# Patient Record
Sex: MALE | Race: WHITE
[De-identification: names, ages, dates, MRNs, and addresses within clinical notes are randomized per-mention and may not be internally consistent; named-entity substitution may affect disease eponyms.]

---

## 2018-01-11 ENCOUNTER — HOSPITAL ENCOUNTER (EMERGENCY)
Dept: HOSPITAL 9 - MADERS | Age: 78
LOS: 1 days | Discharge: ANOTHER HOSPITAL | End: 2018-01-12
Payer: MEDICARE

## 2018-01-11 DIAGNOSIS — J44.9: ICD-10-CM

## 2018-01-11 DIAGNOSIS — I48.91: ICD-10-CM

## 2018-01-11 DIAGNOSIS — F17.210: ICD-10-CM

## 2018-01-11 DIAGNOSIS — Z79.899: ICD-10-CM

## 2018-01-11 DIAGNOSIS — I50.9: ICD-10-CM

## 2018-01-11 DIAGNOSIS — Z79.01: ICD-10-CM

## 2018-01-11 DIAGNOSIS — Z79.82: ICD-10-CM

## 2018-01-11 DIAGNOSIS — J18.9: Primary | ICD-10-CM

## 2018-01-11 DIAGNOSIS — I11.0: ICD-10-CM

## 2018-01-11 DIAGNOSIS — C34.90: ICD-10-CM

## 2018-01-11 LAB
ANION GAP SERPL CALC-SCNC: 19 MMOL/L (ref 10–20)
ANISOCYTOSIS BLD QL SMEAR: (no result) (100X)
BUN SERPL-MCNC: 19 MG/DL (ref 8.4–25.7)
CALCIUM SERPL-MCNC: 9.2 MG/DL (ref 7.8–10.44)
CHLORIDE SERPL-SCNC: 97 MMOL/L (ref 98–107)
CK MB SERPL-MCNC: 4.3 NG/ML (ref 0–6.6)
CO2 SERPL-SCNC: 28 MMOL/L (ref 23–31)
CREAT CL PREDICTED SERPL C-G-VRATE: 0 ML/MIN (ref 70–130)
DELETE AUTO DIFF??: YES
GLUCOSE SERPL-MCNC: 171 MG/DL (ref 83–110)
HGB BLD-MCNC: 14.3 G/DL (ref 14–18)
MACROCYTES BLD QL SMEAR: (no result) (100X)
MCH RBC QN AUTO: 34.1 PG (ref 27–31)
MCV RBC AUTO: 102.4 FL (ref 80–94)
MDIFF COMPLETE?: YES
PLATELET # BLD AUTO: 149 THOU/UL (ref 130–400)
POTASSIUM SERPL-SCNC: 3.9 MMOL/L (ref 3.5–5.1)
RBC # BLD AUTO: 4.18 MILL/UL (ref 4.7–6.1)
SODIUM SERPL-SCNC: 140 MMOL/L (ref 136–145)
TROPONIN I SERPL DL<=0.01 NG/ML-MCNC: 0.06 NG/ML (ref ?–0.03)
WBC # BLD AUTO: 8.4 THOU/UL (ref 4.8–10.8)

## 2018-01-11 PROCEDURE — 94640 AIRWAY INHALATION TREATMENT: CPT

## 2018-01-11 PROCEDURE — 96365 THER/PROPH/DIAG IV INF INIT: CPT

## 2018-01-11 PROCEDURE — 71045 X-RAY EXAM CHEST 1 VIEW: CPT

## 2018-01-11 PROCEDURE — 83880 ASSAY OF NATRIURETIC PEPTIDE: CPT

## 2018-01-11 PROCEDURE — 80048 BASIC METABOLIC PNL TOTAL CA: CPT

## 2018-01-11 PROCEDURE — 96375 TX/PRO/DX INJ NEW DRUG ADDON: CPT

## 2018-01-11 PROCEDURE — 36415 COLL VENOUS BLD VENIPUNCTURE: CPT

## 2018-01-11 PROCEDURE — 93005 ELECTROCARDIOGRAM TRACING: CPT

## 2018-01-11 PROCEDURE — 72125 CT NECK SPINE W/O DYE: CPT

## 2018-01-11 PROCEDURE — 70450 CT HEAD/BRAIN W/O DYE: CPT

## 2018-01-11 PROCEDURE — G0390 TRAUMA RESPONS W/HOSP CRITI: HCPCS

## 2018-01-11 PROCEDURE — 84484 ASSAY OF TROPONIN QUANT: CPT

## 2018-01-11 PROCEDURE — 82553 CREATINE MB FRACTION: CPT

## 2018-01-11 PROCEDURE — 94760 N-INVAS EAR/PLS OXIMETRY 1: CPT

## 2018-01-11 PROCEDURE — 85025 COMPLETE CBC W/AUTO DIFF WBC: CPT

## 2018-01-11 NOTE — CT
CT BRAIN WITHOUT CONTRAST

1/11/18

 

HISTORY: 

Trauma. Fall. Hematoma to the left side of the head.

 

FINDINGS:  

Comparison made to exam of 11/2/03.

 

No evidence of acute infarct, hemorrhage, midline shift or abnormal extra-axial fluid collections are
 seen. The ventricular size is appropriate and the basilar cisterns patent. The bony calvarium is int
act. There is mucosal disease in the paranasal sinuses. Asymmetric calvarial thickening in the lower 
portion of the left medial cranial fossa is stable. There is a scalp hematoma in the left frontoparie
tere region. 

 

IMPRESSION:  

No CT evidence of acute intracranial process. 

 

POS: [facility]

## 2018-01-11 NOTE — CT
CT CERVICAL SPINE WITH CORONAL AND SAGITTAL REFORMATIONS:

1/11/18

 

HISTORY: 

Fall. 

 

FINDINGS/IMPRESSION:  

Multilevel degenerative changes seen in the cervical spine. No acute fracture or subluxation identifi
ed. 

 

There is incomplete evaluation of a mass-like density in the left upper lobe. A right sided pleural e
ffusion is present. Further evaluation with CT scan of the chest would be helpful.

 

 

 

POS: [facility]

## 2018-01-11 NOTE — RAD
PORTABLE CHEST ONE VIEW

1/11/18 at 10:52 p.m.

 

HISTORY: 

Cough, congestion and atrial fibrillation, fall.

 

FINDINGS/IMPRESSION:  

The heart size is normal. The aorta is tortuous. There is patchy consolidation in the left upper lobe
. No pneumothoraces or large effusions are seen. A small right pleural effusion may be present. Findi
ngs are suspicious for pneumonia. 

 

POS: [facility]

## 2018-01-29 ENCOUNTER — HOSPITAL ENCOUNTER (INPATIENT)
Dept: HOSPITAL 92 - ERS | Age: 78
LOS: 7 days | Discharge: HOSPICE | DRG: 808 | End: 2018-02-05
Attending: INTERNAL MEDICINE | Admitting: INTERNAL MEDICINE
Payer: MEDICARE

## 2018-01-29 VITALS — BODY MASS INDEX: 30.4 KG/M2

## 2018-01-29 DIAGNOSIS — I24.8: ICD-10-CM

## 2018-01-29 DIAGNOSIS — J44.1: ICD-10-CM

## 2018-01-29 DIAGNOSIS — Z87.01: ICD-10-CM

## 2018-01-29 DIAGNOSIS — E11.9: ICD-10-CM

## 2018-01-29 DIAGNOSIS — D61.810: Primary | ICD-10-CM

## 2018-01-29 DIAGNOSIS — R13.10: ICD-10-CM

## 2018-01-29 DIAGNOSIS — C34.12: ICD-10-CM

## 2018-01-29 DIAGNOSIS — Z87.891: ICD-10-CM

## 2018-01-29 DIAGNOSIS — D53.9: ICD-10-CM

## 2018-01-29 DIAGNOSIS — I50.33: ICD-10-CM

## 2018-01-29 DIAGNOSIS — F05: ICD-10-CM

## 2018-01-29 DIAGNOSIS — I11.0: ICD-10-CM

## 2018-01-29 DIAGNOSIS — E87.6: ICD-10-CM

## 2018-01-29 DIAGNOSIS — D68.9: ICD-10-CM

## 2018-01-29 DIAGNOSIS — I48.2: ICD-10-CM

## 2018-01-29 DIAGNOSIS — Z92.21: ICD-10-CM

## 2018-01-29 DIAGNOSIS — Z66: ICD-10-CM

## 2018-01-29 DIAGNOSIS — C79.9: ICD-10-CM

## 2018-01-29 DIAGNOSIS — Z51.5: ICD-10-CM

## 2018-01-29 DIAGNOSIS — A41.1: ICD-10-CM

## 2018-01-29 DIAGNOSIS — N17.9: ICD-10-CM

## 2018-01-29 DIAGNOSIS — D69.6: ICD-10-CM

## 2018-01-29 DIAGNOSIS — R65.20: ICD-10-CM

## 2018-01-29 DIAGNOSIS — G93.40: ICD-10-CM

## 2018-01-29 LAB
ALBUMIN SERPL BCG-MCNC: 3 G/DL (ref 3.4–4.8)
ALP SERPL-CCNC: 64 U/L (ref 40–150)
ALT SERPL W P-5'-P-CCNC: 16 U/L (ref 8–55)
ANION GAP SERPL CALC-SCNC: 13 MMOL/L (ref 10–20)
APTT PPP: 60.5 SEC (ref 22.9–36.1)
AST SERPL-CCNC: 13 U/L (ref 5–34)
BILIRUB SERPL-MCNC: 2.1 MG/DL (ref 0.2–1.2)
BUN SERPL-MCNC: 39 MG/DL (ref 8.4–25.7)
CALCIUM SERPL-MCNC: 9.1 MG/DL (ref 7.8–10.44)
CHLORIDE SERPL-SCNC: 103 MMOL/L (ref 98–107)
CK MB SERPL-MCNC: 0.7 NG/ML (ref 0–6.6)
CK MB SERPL-MCNC: 0.8 NG/ML (ref 0–6.6)
CK MB SERPL-MCNC: 1.1 NG/ML (ref 0–6.6)
CK MB SERPL-MCNC: 1.2 NG/ML (ref 0–6.6)
CK SERPL-CCNC: 36 U/L (ref 30–200)
CO2 SERPL-SCNC: 27 MMOL/L (ref 23–31)
CREAT CL PREDICTED SERPL C-G-VRATE: 0 ML/MIN (ref 70–130)
GLOBULIN SER CALC-MCNC: 2.6 G/DL (ref 2.4–3.5)
GLUCOSE SERPL-MCNC: 218 MG/DL (ref 83–110)
HGB BLD-MCNC: 9.6 G/DL (ref 14–18)
INR PPP: 2.2
MACROCYTES BLD QL SMEAR: (no result) (100X)
MAGNESIUM SERPL-MCNC: 1.7 MG/DL (ref 1.6–2.6)
MCH RBC QN AUTO: 35 PG (ref 27–31)
MCV RBC AUTO: 102 FL (ref 80–94)
MDIFF COMPLETE?: YES
PLATELET # BLD AUTO: 5 THOU/UL (ref 130–400)
PLATELET BLD QL SMEAR: (no result)
POTASSIUM SERPL-SCNC: 3.9 MMOL/L (ref 3.5–5.1)
PROTHROMBIN TIME: 25.4 SEC (ref 12–14.7)
RBC # BLD AUTO: 2.74 MILL/UL (ref 4.7–6.1)
REFLEX FOR REVIEW??: YES
SODIUM SERPL-SCNC: 139 MMOL/L (ref 136–145)
SPHEROCYTES BLD QL SMEAR: (no result) (100X)
TROPONIN I SERPL DL<=0.01 NG/ML-MCNC: 0.07 NG/ML (ref ?–0.03)
TROPONIN I SERPL DL<=0.01 NG/ML-MCNC: 0.07 NG/ML (ref ?–0.03)
TROPONIN I SERPL DL<=0.01 NG/ML-MCNC: 0.08 NG/ML (ref ?–0.03)
TROPONIN I SERPL DL<=0.01 NG/ML-MCNC: 0.08 NG/ML (ref ?–0.03)
WBC # BLD AUTO: 0.4 THOU/UL (ref 4.8–10.8)

## 2018-01-29 PROCEDURE — 85025 COMPLETE CBC W/AUTO DIFF WBC: CPT

## 2018-01-29 PROCEDURE — 82550 ASSAY OF CK (CPK): CPT

## 2018-01-29 PROCEDURE — 85027 COMPLETE CBC AUTOMATED: CPT

## 2018-01-29 PROCEDURE — 70491 CT SOFT TISSUE NECK W/DYE: CPT

## 2018-01-29 PROCEDURE — 96361 HYDRATE IV INFUSION ADD-ON: CPT

## 2018-01-29 PROCEDURE — 70450 CT HEAD/BRAIN W/O DYE: CPT

## 2018-01-29 PROCEDURE — 96365 THER/PROPH/DIAG IV INF INIT: CPT

## 2018-01-29 PROCEDURE — 87186 SC STD MICRODIL/AGAR DIL: CPT

## 2018-01-29 PROCEDURE — 85730 THROMBOPLASTIN TIME PARTIAL: CPT

## 2018-01-29 PROCEDURE — 85007 BL SMEAR W/DIFF WBC COUNT: CPT

## 2018-01-29 PROCEDURE — 94640 AIRWAY INHALATION TREATMENT: CPT

## 2018-01-29 PROCEDURE — 80053 COMPREHEN METABOLIC PANEL: CPT

## 2018-01-29 PROCEDURE — 83036 HEMOGLOBIN GLYCOSYLATED A1C: CPT

## 2018-01-29 PROCEDURE — 96375 TX/PRO/DX INJ NEW DRUG ADDON: CPT

## 2018-01-29 PROCEDURE — A4216 STERILE WATER/SALINE, 10 ML: HCPCS

## 2018-01-29 PROCEDURE — 86900 BLOOD TYPING SEROLOGIC ABO: CPT

## 2018-01-29 PROCEDURE — 36430 TRANSFUSION BLD/BLD COMPNT: CPT

## 2018-01-29 PROCEDURE — 93306 TTE W/DOPPLER COMPLETE: CPT

## 2018-01-29 PROCEDURE — 86850 RBC ANTIBODY SCREEN: CPT

## 2018-01-29 PROCEDURE — 83735 ASSAY OF MAGNESIUM: CPT

## 2018-01-29 PROCEDURE — 87077 CULTURE AEROBIC IDENTIFY: CPT

## 2018-01-29 PROCEDURE — 85610 PROTHROMBIN TIME: CPT

## 2018-01-29 PROCEDURE — 83605 ASSAY OF LACTIC ACID: CPT

## 2018-01-29 PROCEDURE — 80048 BASIC METABOLIC PNL TOTAL CA: CPT

## 2018-01-29 PROCEDURE — 80202 ASSAY OF VANCOMYCIN: CPT

## 2018-01-29 PROCEDURE — 74230 X-RAY XM SWLNG FUNCJ C+: CPT

## 2018-01-29 PROCEDURE — 30233R1 TRANSFUSION OF NONAUTOLOGOUS PLATELETS INTO PERIPHERAL VEIN, PERCUTANEOUS APPROACH: ICD-10-PCS | Performed by: FAMILY MEDICINE

## 2018-01-29 PROCEDURE — 96367 TX/PROPH/DG ADDL SEQ IV INF: CPT

## 2018-01-29 PROCEDURE — 83880 ASSAY OF NATRIURETIC PEPTIDE: CPT

## 2018-01-29 PROCEDURE — 82553 CREATINE MB FRACTION: CPT

## 2018-01-29 PROCEDURE — 71045 X-RAY EXAM CHEST 1 VIEW: CPT

## 2018-01-29 PROCEDURE — 85060 BLOOD SMEAR INTERPRETATION: CPT

## 2018-01-29 PROCEDURE — 72125 CT NECK SPINE W/O DYE: CPT

## 2018-01-29 PROCEDURE — 36416 COLLJ CAPILLARY BLOOD SPEC: CPT

## 2018-01-29 PROCEDURE — 86901 BLOOD TYPING SEROLOGIC RH(D): CPT

## 2018-01-29 PROCEDURE — 87040 BLOOD CULTURE FOR BACTERIA: CPT

## 2018-01-29 PROCEDURE — P9035 PLATELET PHERES LEUKOREDUCED: HCPCS

## 2018-01-29 PROCEDURE — 84484 ASSAY OF TROPONIN QUANT: CPT

## 2018-01-29 PROCEDURE — 93005 ELECTROCARDIOGRAM TRACING: CPT

## 2018-01-29 PROCEDURE — 87149 DNA/RNA DIRECT PROBE: CPT

## 2018-01-29 PROCEDURE — 36415 COLL VENOUS BLD VENIPUNCTURE: CPT

## 2018-01-29 RX ADMIN — Medication SCH: at 16:48

## 2018-01-29 RX ADMIN — MULTIPLE VITAMINS W/ MINERALS TAB SCH: TAB at 16:48

## 2018-01-29 RX ADMIN — CEFEPIME HYDROCHLORIDE SCH MLS: 2 INJECTION, POWDER, FOR SOLUTION INTRAVENOUS at 22:16

## 2018-01-29 RX ADMIN — CEFEPIME HYDROCHLORIDE SCH: 2 INJECTION, POWDER, FOR SOLUTION INTRAVENOUS at 13:12

## 2018-01-29 RX ADMIN — Medication SCH ML: at 21:58

## 2018-01-29 RX ADMIN — CYANOCOBALAMIN TAB 1000 MCG SCH: 1000 TAB at 16:47

## 2018-01-29 NOTE — CT
PRELIMINARY REPORT/[facility]/EMERGENCY AFTER

HOURS PROCEDURE:

 

EXAM:

CT Head Without Intravenous Contrast

 

EXAM DATE/TIME:

Exam ordered 1/29/2018 3:43 AM

 

CLINICAL HISTORY:

77 years old, male; Injury or trauma; Fall; Initial encounter; Abrasion; Forehead; Patient HX: Previo
us on pacs; Er 3; M 77 w/ HX of lung cancer presents to ed with fever. Began chemo 2 weeks ago, famil
y reports intermittent shaking. Pt had a fall 2 weeks ago. Dark purple bruising noted to left orbital
 area and face

 

TECHNIQUE:

Axial computed tomography images of the head/brain without intravenous contrast.

 

COMPARISON:

No relevant prior studies available.

 

FINDINGS:

Brain: Volume loss and chronic small vessel ischemic change. No hemorrhage.

Ventricles: Unremarkable. No ventriculomegaly.

Bones/joints: Thickening of the inferior left temporal skull may represent fibrous dysplasia. No frac
ture.

Soft tissues: Unremarkable.

Sinuses: Unremarkable as visualized. No acute sinusitis.

Mastoid air cells: Unremarkable as visualized. No mastoid effusion.

 

IMPRESSION:

No intracranial hemorrhage.

 

Thank you for allowing us to participate in the care of your patient.

 

Dictated and Authenticated by: Juan Rose MD

01/29/2018 3:57 AM Central Time ([place] & [place])

 

 

 

                           FINAL REPORT

 

CT BRAIN WITHOUT CONTRAST:

 

I agree with the preliminary report given by Dr. Juan Rose of [facility].

Comparison is made with the exam of 1/11/2018.  No CT evidence of acute intracranial process is seen.
 

 

POS: [facility]

## 2018-01-29 NOTE — CON
DATE OF CONSULTATION:  01/29/2018

 

REASON FOR CONSULTATION:  Pancytopenia.

 

HISTORY OF PRESENT ILLNESS:  Mr. Newman is a 77-year-old gentleman, who has a limited-stage 3 small
 cell lung cancer.  He just completed cycle 1 of 3-day treatment consisting of carboplatin and -16.
  This was completed on Friday, 01/19/2018.  He did receive a Neulasta injection after treatment.  Ov
er the last 24-36 hours, the patient has had low-grade fever with chills.  He has had confusion.  He 
has, in the past, had multiple falls and again fell over the weekend.  He had a left clavicle fractur
e as a result of the fall on 01/13/2018.  Patient called our service early this morning and was sent 
to the emergency room for evaluation.  Once here, his white count was 400, his hemoglobin was 9.6, an
d his platelet count was 5000.  A 1 unit of platelets has been ordered and ready for transfusion.  Th
e patient has had some confusion over the past few days as well.  He did have a brain CT, which showe
d no metastatic disease.  He had a cervical spine CT, which showed a nondisplaced fracture of the T1 
vertebra.  On arrival, the patient was in atrial fibrillation with RVR.  His BNP was 1439.  He was pa
ncultured and given cefepime and Zosyn and vancomycin.  He did receive a dose of digoxin for his atri
al fibrillation.  He is being admitted for neutropenic fever.

 

PAST MEDICAL HISTORY:

1.  Limited-stage 3 small cell lung cancer.

2.  Diabetes mellitus, 2.

3.  High blood pressure.

4.  Chronic obstructive pulmonary disease.

5.  Anxiety, depression.

6.  Hemorrhoids.

7.  Arthritis.

8.  Sciatica.

9.  Questionable Alzheimer's.

10.  Atrial fibrillation.

 

PAST SURGICAL HISTORY:

1.  Lung biopsy.

2.  Stent placement.

3.  Cholecystectomy.

 

ALLERGIES:  No known drug allergies.

 

HOME MEDICATIONS:

1.  Alprazolam 1 mg daily.

2.  Aspirin 81 mg daily.

3.  Coreg 6.25 mg b.i.d.

4.  Duloxetine 60 mg daily.

5.  Eliquis 5 mg daily.

6.  Furosemide 20 mg daily.

7.  Hydrocodone p.r.n.

8.  Lisinopril 20 mg daily.

9.  Lovastatin 40 mg daily.

10.  Quetiapine 50 mg in the morning and 100 mg in the evening.

11.  Spironolactone 25 mg daily.

 

FAMILY HISTORY:  Noncontributory.

 

SOCIAL HISTORY:  , lives with his spouse.  He smokes a pack a day.  He has former alcohol cons
umption, but none now.  No illicit drug use.  He is a retired .

 

REVIEW OF SYSTEMS:  Constitutional:  Positive for fever and chills and a 50-pound weight loss over th
e last 6 months.  Eyes:  No blurred or double vision.  ENT:  Positive for sore throat, hoarseness, dy
sphagia.  Cardiovascular:  No chest pain, palpitations, or syncope.  Respiratory:  Positive for short
ness of breath and cough.  Gastrointestinal:  Positive for nausea and abdominal pain.  Genitourinary:
  Positive for joint and back pain.  Skin:  No rash or pruritus.  Hematological:  Positive for bruisi
ng.  Genitourinary:  Denies dysuria or hematuria.  Neurological:  Positive for weakness.  No headache
, seizures, or syncope.  Psychiatric:  Positive for anxiety or depression.

 

PHYSICAL EXAMINATION:

VITAL SIGNS:  His pulse is 101, respiratory rate 21.  His vital signs per ER record.

GENERAL:  This is a disheveled male in mild distress.

HEENT:  Normocephalic.  He has bruising to his left orbital.  His pupils are equal and reactive to li
ght.

NECK:  Supple.

CARDIOVASCULAR:  He is in a regular rhythm.

LUNGS:  Diminished throughout.

ABDOMEN:  Soft, nontender, bowel sounds are positive.

EXTREMITIES:  No clubbing, cyanosis, or edema.

SKIN:  No rash.

HEMATOLOGICAL:  He has ecchymosis and scattered bruising, particularly to his chest wall.

NEUROLOGICAL:  The patient is confused to place and time, but answers questions appropriately.

 

PERTINENT LABORATORY AND X-RAYS:  Current WBCs are 400, hemoglobin 9.6, hematocrit 28.0, platelet cou
nt is 5.  PT is 25.4, INR is 2.2, PTT 60.5.  Sodium is 139, potassium 3.9, chloride 103, CO2 is 27, B
UN is 39, creatinine 1.16.  Lactic acid 1.9, calcium 9.1, magnesium 1.7, total bilirubin is 2.1, AST 
is 13, ALT is 16, alkaline phosphatase is 64.  Creatine kinase is 36, CK-MB 0.8, troponin is 0.077, B
NP is 1439, serum total protein 5.3, albumin 3.0, globulin 2.6.  Radiology:  Per HPI.

 

ASSESSMENT PLAN:

1.  Limited-small cell lung cancer, status post chemotherapy.

2.  Neutropenic fever.

3.  Thrombocytopenia.

4.  Atrial fibrillation with rapid ventricular response.

 

DISCUSSION:  The patient has been covered with empiric antibiotics and pancultured.  We will await th
ose results.  He did receive Neulasta after chemotherapy.  I expect his white count to improve in the
 next few days.  He will be transfused platelets for platelet count of 5000.  Neutropenic precautions
.  He is being admitted to telemetry for monitoring of his arrhythmia.  Hopefully, he will improve ov
er the next few days and his wife is aware of seriousness of his medical state and we will follow his
 hospital course closely.

 

Thank you for the consult.

## 2018-01-29 NOTE — RAD
PORTABLE CHEST 1 VIEW:

 

Date:  01/29/18 

Time:  0302 hours

 

HISTORY: 

Fever. 

 

FINDINGS/IMPRESSION: 

 

Comparison made with exam of 01/11/18. 

 

The heart size is borderline. No confluent areas of consolidation, pneumothorax, ab pulmonary swetha
a, or large effusions are seen. 

 

 

POS: [facility]

## 2018-01-29 NOTE — CT
PRELIMINARY REPORT/[facility]/EMERGENCY AFTER

HOURS PROCEDURE:

 

EXAM:

CT Cervical Spine Without Intravenous Contrast

 

EXAM DATE/TIME:

Exam ordered 1/29/2018 3:39 AM

 

CLINICAL HISTORY:

77 years old, male; Injury or trauma; Fall; Initial encounter; Abrasion; Patient HX: Previous on pacs
; Er 3; M 77 w/ HX of lung cancer presents to ed with fever. Began chemo 2 weeks ago, family reports 
intermittent shaking. Pt had a fall 2 weeks ago. Dark purple bruising noted to left orbital area and 
face

 

TECHNIQUE:

Axial computed tomography images of the cervical spine without intravenous contrast.

Coronal and sagittal reformatted images were created and reviewed.

 

COMPARISON:

No relevant prior studies available.

 

FINDINGS:

Vertebrae: Unremarkable. No acute fracture.

Discs/spinal canal/neural foramina: Degenerative change. No fracture. No spinal canal stenosis.

Soft tissues: Unremarkable.

Lymph nodes: Medial left supracavicular lymphadenopathy measuring up to 1 cm in short axis.

Lung apices: Incompletely visualized right pleural effusion.

 

IMPRESSION:

1. No fracture.

2. Incompletely visualized right pleural effusion.

3. Medial left supracavicular lymphadenopathy measuring up to 1 cm in short axis.

 

Thank you for allowing us to participate in the care of your patient.

 

Dictated and Authenticated by: Juan Rose MD

01/29/2018 3:54 AM Central Time ([place] & [place])

 

 

 

FINAL REPORT

CT CERVICAL SPINE WITH CORONAL AND SAGITTAL REFORMATIONS:

 

Date:  01/29/18 

 

FINDINGS/IMPRESSION: 

There is a nondisplaced fracture involving the left transverse process of T1 vertebra. This was not m
entioned on the preliminary report given by Mauri. I am in agreement with the remainder of the finding
s on the exam. 

 

Findings called over the telephone to ER physician, Dr. Melgoza, at 0745 hours on 01/29/18. 

 

 

 

POS: [facility]

## 2018-01-29 NOTE — HP
PRIMARY CARE PHYSICIAN:  [facility] call admission.

 

PRIMARY ONCOLOGIST:  Dr. Belcher.

 

REASON FOR ADMISSION:  Neutropenic fever, atrial fibrillation with rapid ventricular response, sepsis
, encephalopathy, frequent fall, and pancytopenia.

 

HISTORY OF PRESENT ILLNESS:  A 77-year-old male who has history of metastatic lung cancer followed by
 Dr. Belcher.  Patient underwent first cycle of chemotherapy about 2 weeks ago.  The patient received
 3 days chemotherapy and after that he was supposed to get another cycle of chemotherapy after 3 week
s.  Patient did very well first few days after chemotherapy, but after that, patient's condition grad
ually going downhill.  He was becoming more and more weak.  He was falling frequently.  He was gettin
g more bruise on his skin.  He was disoriented for the last couple of days.  He started having high-g
rade fever with chills since Friday.  His condition day by day was getting worse and that is why daniela
ent's wife brought him to emergency room for evaluation.

 

When he presented to emergency room, he was hypotensive.  He was having atrial fibrillation with rapi
d ventricular response.  He was febrile with T-maximum 101.7 in the emergency room.  He has also appe
ared disoriented.  Routine blood test showed severe thrombocytopenia along with pancytopenia.  He had
 elevated troponin as well as elevated BNP.  He is chronically short of breath from his COPD history.


 

Patient was recently admitted at [facility] on 01/12/2018.  At that time, CT abdo
men and pelvis showed possible pneumonia, heterogenous mass in right kidney and concern of renal mass
.  Bone scan showed no evidence of mets recently.  Left upper lobe lung mass biopsied and subsequentl
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  Patient has chronic atrial fibrillation.  Patient
 was following Dr. Muñoz and he discontinued couple of medications for atrial fibrillation which w
as not working.  Patient's wife was present at bedside in the emergency room.  Patient was not partic
ipating with history because of disorientation, but he was following simple commands.  Most of the hi
story given by patient's wife.  Patient's wife also reports that patient and herself wants him to be 
DNR while in hospital.

 

REVIEW OF SYSTEMS:  The following complete review of systems was negative, unless otherwise mentioned
 in the HPI or below:

Constitutional:  Weight loss or gain, ability to conduct usual activities.

Skin:  Rash, itching.

Eyes:  Double vision, pain.

ENT/Mouth:  Nose bleeding, neck stiffness, pain, tenderness.

Cardiovascular:  Palpitations, dyspnea on exertion, orthopnea.

Respiratory:  Shortness of breath, wheezing, cough, hemoptysis, fever or night sweats.

Gastrointestinal:  Poor appetite, abdominal pain, heartburn, nausea, vomiting, constipation, or diarr
hea.

Genitourinary:  Urgency, frequency, dysuria, nocturia.

Musculoskeletal:  Pain, swelling.

Neurologic/Psychiatric:  Anxiety, depression.

Allergy/Immunologic:  Skin rash, bleeding tendency.

 

Please see my HPI for pertinent positive and negative.  All other review of system reviewed and negat
katarina except as mentioned in the HPI.  Review of system is also not reliable because of patient's curre
nt cognitive status.

 

ALLERGIES:  SULFA DRUGS.

 

PAST MEDICAL HISTORY:  Metastatic lung carcinoma on chemotherapy, atrial fibrillation chronic, hypert
ension, COPD, dyslipidemia, and history of diabetes.

 

PAST SURGICAL HISTORY:  Thoracentesis was performed recently.  Lung biopsy was obtained which showed 
small cell lung cancer and cholecystectomy.

 

PAST PSYCHIATRIC HISTORY:  Anxiety and depression.

 

SOCIAL HISTORY:  Patient is .  He has history of smoking.  He is a heavy smoker.  He denies an
y alcohol abuse.  He denies any other illicit drug abuse.

 

FAMILY HISTORY:  No strong family history of premature coronary artery disease, stroke or cancer.

 

CURRENT HOME MEDICATIONS:  The patient's wife did not bring home medication, but based on our previou
s hospital records, patient is on following medications:  Xanax 1 mg q.8 hourly p.r.n., aspirin 81 mg
 p.o. daily, Coreg 6.25 mg p.o. b.i.d., Cymbalta 60 mg p.o. daily, Eliquis 5 mg p.o. daily (it is not
 known whether patient is still taking this medication or not), Lasix 20 mg p.o. daily, lisinopril 20
 mg p.o. daily, lovastatin 40 mg p.o. at bedtime, Seroquel 50 mg in morning and 100 mg at bedtime, Al
dactone 25 mg p.o. daily, DuoNeb twice daily.

 

EMERGENCY ROOM COURSE:  The patient has received IV fluid, vancomycin, Zosyn, Xanax 0.5 mg, Tylenol 1
 gram, and IV fluid.

 

PHYSICAL EXAMINATION:

VITAL SIGNS:  On arrival, blood pressure 86/55, pulse 121 irregular, temperature 101.7, saturation 10
0% on 2 liter oxygen, weight 134.7 kilograms.

GENERAL:  Patient is currently alert, follows simple commands, but appears slightly confused and diso
riented.  He is febrile.

HEAD:  Normocephalic, atraumatic.

EYES:  Pupils round, reactive to light.  Extraocular muscle intact.

ENT:  Oropharynx within normal limits.  Moist mucous membranes.  No oral lesions.  No pharyngeal eryt
hema.  No exudate.  No thrush.

NECK:  No JVD, no thyromegaly, no carotid bruit, no meningeal signs of irritation.

LUNGS:  Bibasilar coarse breath sounds noted.  Few end expiratory wheezing heard.

CARDIAC:  S1 and S2 irregularly irregular, tachycardia, no murmur elicited, no gallop, no rub.

ABDOMEN:  Soft, bowel sounds present, nontender, nondistended.  No organomegaly, no mass, no suprapub
ic tenderness.

BACK:  Examination unremarkable, no CVA tenderness.

EXTREMITIES:  Upper extremity passive movements of all joints are normal.  Lower extremity passive mo
vements of all joints are normal.  Trace edema noted.  Good distal pulsation.

SKIN:  The patient does have multiple bruises across the body from previous fall.

NEUROLOGIC:  He moves all 4 limbs, but currently patient is disoriented.  I am not able to see any fo
robert neurological deficit.

PSYCHIATRIC:  Normal affect.

 

IMAGING DATA AND SIGNIFICANT LABORATORY DATA:

1.  EKG based on my review, atrial fibrillation with rapid ventricular response, RBBB, left axis josue
ation.

2.  CT brain based on my review, no acute intracranial process.

3.  CT cervical spine based on my review, no fracture or dislocation.

4.  Chest x-ray based on my review, COPD changes.

5.  CBC:  WBC 0.4, hemoglobin 9.6, platelet 5, .  INR 2.2.

6.  BMP:  Sodium 139, potassium 3.9, chloride 103, carbon dioxide 27, anion gap 13, BUN 39, creatinin
e 1.16, glucose 218, calcium 9.1, lactic acid 1.9.

7.  Magnesium 1.7.

8.  LFT:  AST 13, ALT 16, alkaline phosphatase 64, albumin 3.0, CK-MB 0.7, troponin I 0.0373, 
9.

 

ASSESSMENT AND PLAN/IMPRESSION:

1.  Neutropenic fever.  This patient has severe neutropenia.  Patient also has associated pancytopeni
a and that is related after chemotherapy.  This patient also has associated high grade fever and seps
is.  We will treat with empiric broad spectrum antibiotic therapy with cefepime, Levaquin, and vancom
ycin.  We will follow up on culture result.  We will provide neutropenic precautions.  We will consul
t Oncology for opinion.

2.  Severe thrombocytopenia.  I will order one unit of platelet transfusion given his multiple bruise
.  Further platelet transfusion will depend upon CBC.

3.  Neutropenia.  We will defer Neulasta or filgrastim therapy to Oncology.

4.  Microcytic anemia.  We will start folic acid, vitamin B12, and multivitamin therapy.

5.  Coagulopathy, likely due to underlying anticoagulant therapy versus sepsis.

6.  Elevated troponin, likely due to demand ischemia from sepsis.  We will do serial cardiac enzymes 
and rule out acute coronary syndrome.

7.  Elevated BNP, likely due to congestive heart failure.  Type of EF is not known and that is why we
 will obtain echocardiography and verify systolic or diastolic dysfunction.  At this point, patient a
ppears to be euvolemic, but we will avoid excess IV fluid to prevent fluid overload status.  We will 
monitor on telemetry floor.

8.  Atrial fibrillation with rapid ventricular response, most likely precipitated by fever and underl
kayla sepsis.  We will continue with digoxin 0.25 mg IV push one time dose and monitor on telemetry fl
oor.  Given low platelet count, patient is not a good candidate for chronic anticoagulation therapy. 
 He is also a fall risk.

9.  Chronic obstructive pulmonary disease without any flareup.  We will continue DuoNeb q.6 hourly an
d Pulmicort nebulization twice daily.

10.  History of small cell lung carcinoma.  The patient is getting chemotherapy.  Doubt, patient will
 tolerate further chemotherapy.  We will consult palliative care.  We will consult Oncology.

11.  Frequent falls and physical deconditioning.  Patient will need PT, OT, and  consulta
tion for possible placement.

12.  Deep venous thrombosis prophylaxis.  No Lovenox because of low platelet count.

13.  Gastrointestinal prophylaxis.  Pepcid 20 mg p.o. b.i.d.

14.  Diabetes.  We will continue with insulin as per sliding scale per protocol.  We will provide giana
betic diet.

15.  Sepsis with acute organ dysfunction.  This patient has sepsis.  Source of infection is not clear
, most likely related to neutropenic fever.  The patient has associated demand ischemia and in this w
ay he meets sepsis with acute organ dysfunction criteria.

 

CODE STATUS:  I spoke with patient and patient's wife at bedside and confirmed the patient's DNR stat
us.  They do not want any kind of resuscitation to be done in case of cardiopulmonary arrest.

 

Disposition plan based on clinical course.  We are expecting patient's stay in hospital more than 2 m
idnights.  Plan of care discussed with patient's wife at bedside.

## 2018-01-30 LAB
ALBUMIN SERPL BCG-MCNC: 2.7 G/DL (ref 3.4–4.8)
ALP SERPL-CCNC: 57 U/L (ref 40–150)
ALT SERPL W P-5'-P-CCNC: 15 U/L (ref 8–55)
ANION GAP SERPL CALC-SCNC: 12 MMOL/L (ref 10–20)
AST SERPL-CCNC: 12 U/L (ref 5–34)
BILIRUB SERPL-MCNC: 1.7 MG/DL (ref 0.2–1.2)
BUN SERPL-MCNC: 51 MG/DL (ref 8.4–25.7)
CALCIUM SERPL-MCNC: 8.2 MG/DL (ref 7.8–10.44)
CHLORIDE SERPL-SCNC: 106 MMOL/L (ref 98–107)
CO2 SERPL-SCNC: 24 MMOL/L (ref 23–31)
CREAT CL PREDICTED SERPL C-G-VRATE: 85 ML/MIN (ref 70–130)
GLOBULIN SER CALC-MCNC: 2.6 G/DL (ref 2.4–3.5)
GLUCOSE SERPL-MCNC: 201 MG/DL (ref 83–110)
HGB BLD-MCNC: 8.1 G/DL (ref 14–18)
MAGNESIUM SERPL-MCNC: 1.5 MG/DL (ref 1.6–2.6)
MCH RBC QN AUTO: 35.3 PG (ref 27–31)
MCV RBC AUTO: 103 FL (ref 80–94)
PLATELET # BLD AUTO: 21 THOU/UL (ref 130–400)
POTASSIUM SERPL-SCNC: 3.5 MMOL/L (ref 3.5–5.1)
RBC # BLD AUTO: 2.3 MILL/UL (ref 4.7–6.1)
SODIUM SERPL-SCNC: 138 MMOL/L (ref 136–145)
VANCOMYCIN TROUGH SERPL-MCNC: 26.3 UG/ML
WBC # BLD AUTO: 0.4 THOU/UL (ref 4.8–10.8)

## 2018-01-30 RX ADMIN — NYSTATIN SCH UNITS: 100000 SUSPENSION ORAL at 16:41

## 2018-01-30 RX ADMIN — CEFEPIME HYDROCHLORIDE SCH MLS: 2 INJECTION, POWDER, FOR SOLUTION INTRAVENOUS at 21:52

## 2018-01-30 RX ADMIN — NYSTATIN SCH UNITS: 100000 SUSPENSION ORAL at 21:52

## 2018-01-30 RX ADMIN — CEFEPIME HYDROCHLORIDE SCH MLS: 2 INJECTION, POWDER, FOR SOLUTION INTRAVENOUS at 10:09

## 2018-01-30 RX ADMIN — NYSTATIN SCH UNITS: 100000 SUSPENSION ORAL at 13:09

## 2018-01-30 RX ADMIN — CYANOCOBALAMIN TAB 1000 MCG SCH: 1000 TAB at 10:50

## 2018-01-30 RX ADMIN — Medication SCH ML: at 10:10

## 2018-01-30 RX ADMIN — Medication SCH ML: at 21:53

## 2018-01-30 RX ADMIN — MULTIPLE VITAMINS W/ MINERALS TAB SCH: TAB at 10:38

## 2018-01-30 RX ADMIN — Medication PRN ML: at 06:48

## 2018-01-30 NOTE — PDOC.PN
- Subjective


Encounter Start Date: 01/30/18


Encounter Start Time: 07:52


Subjective: fells better, no fever





- Objective


Resuscitation Status: 


 











Resuscitation Status           DNR:Do Not Resuscitate














MAR Reviewed: Yes


Vital Signs & Weight: 


 Vital Signs (12 hours)











  Temp Pulse Resp BP Pulse Ox


 


 01/30/18 04:56      95


 


 01/30/18 04:00  98.9 F  120 H  21 H  127/70  96


 


 01/30/18 00:25   98  18   96


 


 01/30/18 00:00  98.8 F  114 H  22 H  106/68  98


 


 01/29/18 20:00  97.4 F L  98  20  


 


 01/29/18 19:54   99  20   95








 Weight











Weight                         256 lb 9.6 oz














Result Diagrams: 


 01/30/18 04:33





 01/30/18 04:33


Additional Labs: 


 Accuchecks











  01/30/18 01/29/18 01/29/18





  07:01 20:21 17:07


 


POC Glucose  217 H  177 H  195 H














Phys Exam





- Physical Examination


Constitutional: NAD


Neck: no JVD


Respiratory: clear to auscultation bilateral


Cardiovascular: no significant murmur, irregular


Gastrointestinal: soft, non-tender, positive bowel sounds


Musculoskeletal: no edema


Deviation from normal: ecchymosis L face, trunk, etc





Dx/Plan


(1) Neutropenic fever


Code(s): D70.9 - NEUTROPENIA, UNSPECIFIED; R50.81 - FEVER PRESENTING WITH 

CONDITIONS CLASSIFIED ELSEWHERE   Status: Acute   





(2) Status post chemotherapy, time since less than 4 weeks


Code(s): Z92.21 - PERSONAL HISTORY OF ANTINEOPLASTIC CHEMOTHERAPY   Status: 

Acute   





(3) Lung cancer


Code(s): C34.90 - MALIGNANT NEOPLASM OF UNSP PART OF UNSP BRONCHUS OR LUNG   

Status: Acute   





(4) Atrial fibrillation with rapid ventricular response


Code(s): I48.91 - UNSPECIFIED ATRIAL FIBRILLATION   Status: Chronic   





(5) DM type 2 (diabetes mellitus, type 2)


Status: Chronic   


Qualifiers: 


   Diabetes mellitus complication status: without complication 





(6) COPD (chronic obstructive pulmonary disease)


Status: Chronic   


Qualifiers: 


   COPD type: unspecified COPD   Qualified Code(s): J44.9 - Chronic obstructive 

pulmonary disease, unspecified   





- Plan


blood, urine C&S pending


-: cont cefepime, vanc, add levaquin


-: cont accu/ss/etc


-: selected home meds


-: discuss with oncology





* .

## 2018-01-30 NOTE — RAD
MODIFIED BARIUM SWALLOW:

 

CLINICAL HISTORY: 

Dysphagia, unspecified, feeding difficulties.

 

FINDINGS: 

Real-time fluoroscopy preformed in conjunction with speech pathologist who administered the patient v
arying consistencies of barium contrast.  There is recurrent laryngeal penetration demonstrated.  No 
tracheal aspiration identified.

 

Reference speech pathology report for further details.

 

IMPRESSION: 

Laryngeal penetration is demonstrated under real-time fluoroscopy, without evidence of tracheal aspir
ation.

 

POS: [facility]

## 2018-01-31 LAB
HGB BLD-MCNC: 8.4 G/DL (ref 14–18)
MACROCYTES BLD QL SMEAR: (no result) (100X)
MCH RBC QN AUTO: 34.1 PG (ref 27–31)
MCV RBC AUTO: 104 FL (ref 80–94)
MDIFF COMPLETE?: YES
PLATELET # BLD AUTO: 22 THOU/UL (ref 130–400)
PLATELET BLD QL SMEAR: (no result)
RBC # BLD AUTO: 2.46 MILL/UL (ref 4.7–6.1)
WBC # BLD AUTO: 1.6 THOU/UL (ref 4.8–10.8)

## 2018-01-31 RX ADMIN — NYSTATIN SCH UNITS: 100000 SUSPENSION ORAL at 09:00

## 2018-01-31 RX ADMIN — VANCOMYCIN HYDROCHLORIDE SCH MLS: 1 INJECTION, POWDER, LYOPHILIZED, FOR SOLUTION INTRAVENOUS at 21:27

## 2018-01-31 RX ADMIN — INSULIN LISPRO PRN UNIT: 100 INJECTION, SOLUTION INTRAVENOUS; SUBCUTANEOUS at 17:43

## 2018-01-31 RX ADMIN — CEFEPIME HYDROCHLORIDE SCH MLS: 2 INJECTION, POWDER, FOR SOLUTION INTRAVENOUS at 21:24

## 2018-01-31 RX ADMIN — CYANOCOBALAMIN TAB 1000 MCG SCH MCG: 1000 TAB at 08:26

## 2018-01-31 RX ADMIN — VANCOMYCIN HYDROCHLORIDE SCH MLS: 1 INJECTION, POWDER, LYOPHILIZED, FOR SOLUTION INTRAVENOUS at 09:35

## 2018-01-31 RX ADMIN — Medication SCH: at 22:49

## 2018-01-31 RX ADMIN — HYDROCODONE BITARTRATE AND ACETAMINOPHEN PRN TAB: 5; 325 TABLET ORAL at 21:29

## 2018-01-31 RX ADMIN — NYSTATIN SCH UNITS: 100000 SUSPENSION ORAL at 21:27

## 2018-01-31 RX ADMIN — NYSTATIN SCH: 100000 SUSPENSION ORAL at 17:26

## 2018-01-31 RX ADMIN — NYSTATIN SCH: 100000 SUSPENSION ORAL at 17:27

## 2018-01-31 RX ADMIN — NYSTATIN SCH UNITS: 100000 SUSPENSION ORAL at 15:54

## 2018-01-31 RX ADMIN — NYSTATIN SCH UNITS: 100000 SUSPENSION ORAL at 17:27

## 2018-01-31 RX ADMIN — CEFEPIME HYDROCHLORIDE SCH MLS: 2 INJECTION, POWDER, FOR SOLUTION INTRAVENOUS at 08:33

## 2018-01-31 RX ADMIN — Medication SCH ML: at 08:32

## 2018-01-31 RX ADMIN — INSULIN LISPRO PRN UNIT: 100 INJECTION, SOLUTION INTRAVENOUS; SUBCUTANEOUS at 11:14

## 2018-01-31 RX ADMIN — MULTIPLE VITAMINS W/ MINERALS TAB SCH TAB: TAB at 08:28

## 2018-01-31 NOTE — PDOC.PN
- Subjective


Encounter Start Date: 01/31/18


Encounter Start Time: 10:01


Subjective: lethargic, weak





- Objective


Resuscitation Status: 


 











Resuscitation Status           DNR:Do Not Resuscitate














MAR Reviewed: Yes


Vital Signs & Weight: 


 Vital Signs (12 hours)











  Temp Pulse Resp BP BP Pulse Ox


 


 01/31/18 08:29     114/64  


 


 01/31/18 08:26     114/64  


 


 01/31/18 08:00  97.5 F L  92  28 H   123/58 L  95


 


 01/31/18 07:44    20   


 


 01/31/18 07:42    20   


 


 01/31/18 04:00  97.3 F L  56 L  16   102/63  96


 


 01/31/18 00:00  97.5 F L  55 L  16   110/75  98


 


 01/30/18 23:47   100  20    98








 Weight











Admit Weight                   256 lb 9.6 oz


 


Weight                         256 lb 9.6 oz














I&O: 


 











 01/30/18 01/31/18 02/01/18





 06:59 06:59 06:59


 


Intake Total 50 690 


 


Balance 50 690 











Result Diagrams: 


 01/30/18 04:33





 01/30/18 04:33


Additional Labs: 


 Accuchecks











  01/31/18 01/30/18 01/30/18





  04:46 20:19 16:28


 


POC Glucose  285 H  251 H  333 H














  01/30/18





  11:24


 


POC Glucose  294 H














Phys Exam





- Physical Examination


Neck: no JVD


Respiratory: clear to auscultation bilateral


Cardiovascular: RRR, no significant murmur


Gastrointestinal: soft, non-tender, positive bowel sounds


Musculoskeletal: no edema





Dx/Plan


(1) Neutropenic fever


Code(s): D70.9 - NEUTROPENIA, UNSPECIFIED; R50.81 - FEVER PRESENTING WITH 

CONDITIONS CLASSIFIED ELSEWHERE   Status: Acute   





(2) Status post chemotherapy, time since less than 4 weeks


Code(s): Z92.21 - PERSONAL HISTORY OF ANTINEOPLASTIC CHEMOTHERAPY   Status: 

Acute   





(3) Lung cancer


Code(s): C34.90 - MALIGNANT NEOPLASM OF UNSP PART OF UNSP BRONCHUS OR LUNG   

Status: Acute   





(4) Atrial fibrillation with rapid ventricular response


Code(s): I48.91 - UNSPECIFIED ATRIAL FIBRILLATION   Status: Chronic   





(5) DM type 2 (diabetes mellitus, type 2)


Status: Chronic   


Qualifiers: 


   Diabetes mellitus complication status: without complication 





(6) COPD (chronic obstructive pulmonary disease)


Status: Chronic   


Qualifiers: 


   COPD type: unspecified COPD   Qualified Code(s): J44.9 - Chronic obstructive 

pulmonary disease, unspecified   





(7) Sepsis due to coagulase-negative staphylococcal infection


Code(s): A41.1 - SEPSIS DUE TO OTHER SPECIFIED STAPHYLOCOCCUS   Status: Acute   

Comment: 2/2 blood C&S positive   





- Plan


plan discussed w/ family


iv fluids, serial lab, cont current broad spectrum coverage


-: discussed plan status with wife





* .

## 2018-02-01 LAB
HGB BLD-MCNC: 8.5 G/DL (ref 14–18)
MCH RBC QN AUTO: 34.8 PG (ref 27–31)
MCV RBC AUTO: 103 FL (ref 80–94)
MDIFF COMPLETE?: YES
PLATELET # BLD AUTO: 19 THOU/UL (ref 130–400)
PLATELET BLD QL SMEAR: (no result)
RBC # BLD AUTO: 2.44 MILL/UL (ref 4.7–6.1)
VANCOMYCIN TROUGH SERPL-MCNC: 34.1 UG/ML
WBC # BLD AUTO: 4 THOU/UL (ref 4.8–10.8)

## 2018-02-01 RX ADMIN — NYSTATIN SCH UNITS: 100000 SUSPENSION ORAL at 09:42

## 2018-02-01 RX ADMIN — Medication SCH ML: at 20:25

## 2018-02-01 RX ADMIN — MULTIPLE VITAMINS W/ MINERALS TAB SCH TAB: TAB at 09:30

## 2018-02-01 RX ADMIN — NYSTATIN SCH UNITS: 100000 SUSPENSION ORAL at 13:50

## 2018-02-01 RX ADMIN — NYSTATIN SCH: 100000 SUSPENSION ORAL at 18:05

## 2018-02-01 RX ADMIN — VANCOMYCIN HYDROCHLORIDE SCH: 1 INJECTION, POWDER, LYOPHILIZED, FOR SOLUTION INTRAVENOUS at 20:32

## 2018-02-01 RX ADMIN — NYSTATIN SCH UNITS: 100000 SUSPENSION ORAL at 20:30

## 2018-02-01 RX ADMIN — INSULIN LISPRO PRN UNIT: 100 INJECTION, SOLUTION INTRAVENOUS; SUBCUTANEOUS at 06:23

## 2018-02-01 RX ADMIN — CEFEPIME HYDROCHLORIDE SCH MLS: 2 INJECTION, POWDER, FOR SOLUTION INTRAVENOUS at 20:25

## 2018-02-01 RX ADMIN — CYANOCOBALAMIN TAB 1000 MCG SCH MCG: 1000 TAB at 09:30

## 2018-02-01 RX ADMIN — Medication SCH: at 09:52

## 2018-02-01 RX ADMIN — HYDROCODONE BITARTRATE AND ACETAMINOPHEN PRN TAB: 5; 325 TABLET ORAL at 21:04

## 2018-02-01 RX ADMIN — CEFEPIME HYDROCHLORIDE SCH MLS: 2 INJECTION, POWDER, FOR SOLUTION INTRAVENOUS at 09:42

## 2018-02-01 RX ADMIN — VANCOMYCIN HYDROCHLORIDE SCH MLS: 1 INJECTION, POWDER, LYOPHILIZED, FOR SOLUTION INTRAVENOUS at 09:43

## 2018-02-01 NOTE — PDOC.PN
- Subjective


Encounter Start Date: 02/01/18


Encounter Start Time: 13:05


Subjective: f/u for sepsis, neutropenic fever and end-stage SCLC. Remains weak,


-: lethargic and confused. Palliative care met with wife and considering 


-: hospice. 





- Objective


Resuscitation Status: 


 











Resuscitation Status           DNR:Do Not Resuscitate














MAR Reviewed: Yes


Vital Signs & Weight: 


 Vital Signs (12 hours)











  Temp Pulse Resp BP BP Pulse Ox


 


 02/01/18 13:18   98  20    96


 


 02/01/18 12:00  97.6 F  80  20   117/84  92 L


 


 02/01/18 09:30     112/72  


 


 02/01/18 09:28     112/72  


 


 02/01/18 08:18   106 H  20    95


 


 02/01/18 08:17   106 H  20    95


 


 02/01/18 08:00  97.4 F L  106 H  20   112/72  93 L








 Weight











Admit Weight                   256 lb 9.6 oz


 


Weight                         256 lb 9.6 oz














I&O: 


 











 01/31/18 02/01/18 02/02/18





 06:59 06:59 06:59


 


Intake Total 690 200 


 


Balance 690 200 











Result Diagrams: 


 02/01/18 05:40





 01/30/18 04:33


Additional Labs: 


 Accuchecks











  02/01/18 02/01/18 01/31/18





  11:24 05:27 19:16


 


POC Glucose  123 H  226 H  208 H














  01/31/18





  17:35


 


POC Glucose  199 H








Microbiology





01/29/18 04:07   Venous blood - Left Arm   Blood Culture - Preliminary


                              Staphylococcus epidermidis


01/29/18 04:07   Venous blood - Left Arm   Blood Culture - Preliminary


                              Coagulase Neg Staphylococcus





 Laboratory Tests











  01/29/18 01/30/18 01/31/18





  02:55 04:33 09:59


 


WBC  0.4 L*  0.4 L*  1.6 L


 


Hgb  9.6 L  8.1 L  8.4 L


 


Plt Count  5 L*  21 L*  22 L*











Radiology Reviewed by me: Yes (MBS - laryngeal penetration, no trach aspiration)





Phys Exam





- Physical Examination


ill-appearing, lethargic, resp distress


HEENT: PERRLA


Neck: no JVD, supple


coarse sounds bilat


Cardiovascular: irregular


Gastrointestinal: soft, non-tender, no distention, positive bowel sounds


hyperpigmentation of BLE's, ecchymosis and contusions of LE's


Musculoskeletal: pulses present, edema present


Skin: normal turgor, cap refill <2 seconds





Dx/Plan


(1) Lung cancer


Code(s): C34.90 - MALIGNANT NEOPLASM OF UNSP PART OF UNSP BRONCHUS OR LUNG   

Status: Acute   


Qualifiers: 


   Laterality: unspecified laterality 


Comment: s/p 1 cycle of chemotherapy with pancytopenia and neutropenic fever 

with sepsis, likely will not be able to tolerate 2nd cycle of chemo   





(2) Neutropenic fever


Code(s): D70.9 - NEUTROPENIA, UNSPECIFIED; R50.81 - FEVER PRESENTING WITH 

CONDITIONS CLASSIFIED ELSEWHERE   Status: Acute   Comment: continue supportive 

therapy and neutropenic protocol   





(3) Sepsis due to coagulase-negative staphylococcal infection


Code(s): A41.1 - SEPSIS DUE TO OTHER SPECIFIED STAPHYLOCOCCUS   Status: Acute   

Comment: 2/2 blood C&S positive, continue Vancomycin, Levaquin, Cefepime. De-

escalate abx coverage in 24h   





(4) Status post chemotherapy, time since less than 4 weeks


Code(s): Z92.21 - PERSONAL HISTORY OF ANTINEOPLASTIC CHEMOTHERAPY   Status: 

Acute   





(5) Atrial fibrillation with rapid ventricular response


Code(s): I48.91 - UNSPECIFIED ATRIAL FIBRILLATION   Status: Chronic   Comment: 

Rate variable   





(6) COPD (chronic obstructive pulmonary disease)


Status: Chronic   


Qualifiers: 


   COPD type: unspecified COPD   Qualified Code(s): J44.9 - Chronic obstructive 

pulmonary disease, unspecified   


Comment: Continue Duonebs, Pulmicort, O2 support   





(7) DM type 2 (diabetes mellitus, type 2)


Status: Chronic   


Qualifiers: 


   Diabetes mellitus complication status: without complication 





- Plan


continue antibiotics, , speech therapy, respiratory therapy, DVT 

proph w/SCDs


Continue supportive measures


-: [facility] IVF's


-: Palliative care, Hospice evaluation pending


-: Continue Duonebs, Pulmicort


-: De-escalate abx regimen in 24h





* .

## 2018-02-02 LAB
ANION GAP SERPL CALC-SCNC: 12 MMOL/L (ref 10–20)
BUN SERPL-MCNC: 62 MG/DL (ref 8.4–25.7)
CALCIUM SERPL-MCNC: 8.4 MG/DL (ref 7.8–10.44)
CHLORIDE SERPL-SCNC: 114 MMOL/L (ref 98–107)
CO2 SERPL-SCNC: 19 MMOL/L (ref 23–31)
CREAT CL PREDICTED SERPL C-G-VRATE: 73 ML/MIN (ref 70–130)
DOHLE BOD BLD QL SMEAR: SLIGHT
GLUCOSE SERPL-MCNC: 206 MG/DL (ref 83–110)
HGB BLD-MCNC: 9 G/DL (ref 14–18)
MCH RBC QN AUTO: 33.5 PG (ref 27–31)
MCV RBC AUTO: 104 FL (ref 80–94)
MDIFF COMPLETE?: YES
PLATELET # BLD AUTO: 31 THOU/UL (ref 130–400)
PLATELET BLD QL SMEAR: (no result)
POLYCHROMASIA BLD QL SMEAR: (no result) (100X)
POTASSIUM SERPL-SCNC: 3.9 MMOL/L (ref 3.5–5.1)
RBC # BLD AUTO: 2.68 MILL/UL (ref 4.7–6.1)
SODIUM SERPL-SCNC: 141 MMOL/L (ref 136–145)
TOXIC GRANULES BLD QL SMEAR: SLIGHT
VANCOMYCIN SERPL-MCNC: 29.4 UG/ML
WBC # BLD AUTO: 9.5 THOU/UL (ref 4.8–10.8)

## 2018-02-02 RX ADMIN — Medication SCH ML: at 09:44

## 2018-02-02 RX ADMIN — NYSTATIN SCH: 100000 SUSPENSION ORAL at 17:20

## 2018-02-02 RX ADMIN — MULTIPLE VITAMINS W/ MINERALS TAB SCH TAB: TAB at 09:26

## 2018-02-02 RX ADMIN — DAPTOMYCIN SCH MLS: 500 INJECTION, POWDER, LYOPHILIZED, FOR SOLUTION INTRAVENOUS at 15:56

## 2018-02-02 RX ADMIN — NYSTATIN SCH UNITS: 100000 SUSPENSION ORAL at 09:26

## 2018-02-02 RX ADMIN — INSULIN LISPRO PRN UNIT: 100 INJECTION, SOLUTION INTRAVENOUS; SUBCUTANEOUS at 17:18

## 2018-02-02 RX ADMIN — NYSTATIN SCH: 100000 SUSPENSION ORAL at 14:21

## 2018-02-02 RX ADMIN — Medication SCH ML: at 20:21

## 2018-02-02 RX ADMIN — CYANOCOBALAMIN TAB 1000 MCG SCH MCG: 1000 TAB at 09:27

## 2018-02-02 RX ADMIN — CEFEPIME HYDROCHLORIDE SCH MLS: 2 INJECTION, POWDER, FOR SOLUTION INTRAVENOUS at 09:33

## 2018-02-02 RX ADMIN — NYSTATIN SCH UNITS: 100000 SUSPENSION ORAL at 21:58

## 2018-02-02 RX ADMIN — CEFEPIME HYDROCHLORIDE SCH MLS: 2 INJECTION, POWDER, FOR SOLUTION INTRAVENOUS at 22:03

## 2018-02-02 NOTE — CON
DATE OF CONSULTATION:   02/02/2018

 

REASON FOR CONSULTATION:  Neutropenia with bacteremia.

 

HISTORY OF PRESENT ILLNESS:  A 77-year-old, recently diagnosed with small cell lung cancer, apparentl
y metastatic.  In the biopsy of the left lung mass done at [facility], it was rep
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  He also has a hi
story of COPD, diabetes mellitus type 2, hypertension, and atrial fibrillation.  He started chemother
apy under Dr. Belcher's supervision through a peripheral IV access and received the first course abou
t 3 weeks before and now has developed neutropenia and delirium associated with weakness, frequent fa
lls, and fever.  The patient was brought for evaluation and admitted.  Currently, Mr. Newman is sti
ll delirious.  He does not interact with examiner, will briefly establish eye contact, but does not f
ollow commands.  There is a family member in the room with him.  He has had x-rays to evaluate for fr
actures and there is one fracture in the T1 transverse process.  There has been some concern with asp
iration and swallowing study showed some laryngeal penetration, but no tracheal penetration.  No diar
cecilia and he is voiding spontaneously.

 

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
etes.

 

PAST SURGICAL HISTORY:  Thoracentesis, lung biopsy, cholecystectomy.

 

SOCIAL HISTORY:  , chronic smoking.  Lives at [place].  No alcoholic beverage use.

 

FAMILY HISTORY:  Noncontributory.

 

CURRENT MEDICATIONS:  Tylenol, Norco, Maalox, DuoNebs, Xanax, Lipitor, Coreg, cefepime, vitamin B12, 
dextrose, Cymbalta, Pepcid, Folvite, Robitussin, insulin, Theragran, levofloxacin, and vancomycin.

 

PHYSICAL EXAMINATION:

VITAL SIGNS:  T-max 98.6, currently 97.4; blood pressure 122/74, pulse 108, respirations 16-24; O2 sa
t 94% on nasal cannula 2 liters, now 5 liters.

SKIN:  Shows multiple areas of bruising in the left side of the face and upper extremities.  There is
 extensive bruising in the left anterior chest wall region and extends to the back area as well.  No 
lymphadenopathy.

HEENT:  Ocular movements are conjugate.  Pale conjunctivae.  Nasal passages patent.  Oral cavity with
 quite a few missing teeth, the remainder ones with marked decay and gum disease.

NECK:  Supple.

LUNGS:  With diminished breath sounds in the right hemithorax, few crackles on the left side.

HEART:  S1 and S2, regular rate.

ABDOMEN:  Soft, not distended or tender.  No ascites.  No bladder distention.

:  No genital abnormalities.

EXTREMITIES:  No joint inflammatory activity noted.  Pulses are 1+ in dorsalis pedis.  He is able to 
move extremities, but does not follow commands.

NEUROLOGIC:  He is awake, but delirious, does not establish eye contact other than just briefly, unab
le to reply to questions.

 

LABORATORY DATA:  White cell count was 0.4 up to 9.5, hemoglobin 9, platelets 31 at this time, 55% ne
utrophils, 25% bands.  Chemistry with creatinine up to 1.39.  Speech modified barium with slight pene
tration laryngeal area.  There is an echocardiogram from 01/29/2018 with EF of 50%, atrial fibrillati
on.

 

ASSESSMENT:

1.  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
.

2.  The staging is not clear, but is described as being metastatic.

3.  One course of chemotherapy 3 weeks prior to this admission, now with neutropenia and fever.

4.  Staphylococcus epidermidis bacteremia.

 

DISCUSSION:  The neutrophil count has recovered and we should expect in an uneventful progress except
 for the presence of delirium which is concerning.  This most likely is multifactorial.  The patient'
s bacteremia could be interpreted as contamination of the sample since the samples were drawn at the 
same time in the emergency room or this could reflect true bacteremia in an immunosuppressed host.  [facility] davis does not have devices that could be amenable to colonization by the pathogen and so I do not believ
e he would require protracted treatment.  Since the vancomycin ANT is 2, we will switch him to daptom
ycin for the time being.  Once there is improvement of his mental status, could consider discharge pl
anning.  Repeat blood cultures in a few days to document resolution.  Endocarditis would be less of a
 concern in this situation.

## 2018-02-02 NOTE — PDOC.PN
- Subjective


Encounter Start Date: 02/02/18


Encounter Start Time: 08:00


Subjective: f/u for neutropenic fever, sepsis with MRSE on Vancomycin, Levaquin


-: and Cefepime. Pt remains confused, lethargic. No documented fever.





- Objective


Resuscitation Status: 


 











Resuscitation Status           DNR:Do Not Resuscitate














MAR Reviewed: Yes


Vital Signs & Weight: 


 Vital Signs (12 hours)











  Temp Pulse Resp BP BP BP Pulse Ox


 


 02/02/18 07:54  97.4 F L  79  24 H    139/97 H  92 L


 


 02/02/18 04:00  97.7 F  109 H  20    122/60  96


 


 02/02/18 00:00        93 L


 


 02/01/18 23:40  97.0 F L  133 H  21 H   108/64   93 L


 


 02/01/18 20:14     118/66   








 Weight











Admit Weight                   256 lb 9.6 oz


 


Weight                         256 lb 9.6 oz














I&O: 


 











 02/01/18 02/02/18 02/03/18





 06:59 06:59 06:59


 


Intake Total 200 1100 


 


Output Total  1 


 


Balance 200 1099 











Result Diagrams: 


 02/02/18 06:42





 02/02/18 06:40


Additional Labs: 


 Accuchecks











  02/02/18 02/01/18 02/01/18





  06:37 20:13 16:08


 


POC Glucose  215 H  188 H  156 H














  02/01/18





  11:24


 


POC Glucose  123 H








Microbiology





01/29/18 04:07   Venous blood - Left Arm   Blood Culture - Final


                              Staphylococcus epidermidis


01/29/18 04:07   Venous blood - Left Arm   Blood Culture - Final


                              Staphylococcus epidermidis


01/29/18 04:07   Venous blood - Left Arm   Blood Culture - Preliminary


                              Staphylococcus epidermidis


01/29/18 04:07   Venous blood - Left Arm   Blood Culture - Preliminary


                              Coagulase Neg Staphylococcus





 Laboratory Tests











  01/29/18 01/30/18 01/30/18





  02:55 04:33 04:33


 


WBC  0.4 L*   0.4 L*


 


Hgb  9.6 L   8.1 L


 


Plt Count  5 L*   21 L*


 


Band Neuts % (Manual)   


 


Creatinine   1.20 


 


Vancomycin Trough   














  01/30/18 01/31/18 02/01/18





  19:54 09:59 05:40


 


WBC   1.6 L  4.0 L


 


Hgb   8.4 L  8.5 L


 


Plt Count   22 L*  19 L*


 


Band Neuts % (Manual)    21 H


 


Creatinine   


 


Vancomycin Trough  26.3  














  02/01/18 02/02/18





  19:54 06:42


 


WBC  


 


Hgb  


 


Plt Count  


 


Band Neuts % (Manual)   25 H


 


Creatinine  


 


Vancomycin Trough  34.1 H* 














Phys Exam





- Physical Examination


lethargic, opens eyes briefly to name, mumbles


dry mucosa


HEENT: PERRLA, oral pharynx no lesions


Neck: no JVD, supple


coarse sounds bilat, diminished in bases


Cardiovascular: irregular


Gastrointestinal: soft, non-tender, no distention, positive bowel sounds


Musculoskeletal: no edema, pulses present


Neurological: moves all 4 limbs


Deviation from normal: large areas of ecchymosis on face, chest and upper 

extremities


Skin: normal turgor, cap refill <2 seconds





Dx/Plan


(1) Lung cancer


Code(s): C34.90 - MALIGNANT NEOPLASM OF UNSP PART OF UNSP BRONCHUS OR LUNG   

Status: Acute   


Qualifiers: 


   Laterality: unspecified laterality 


Comment: s/p 1 cycle of chemotherapy with pancytopenia and neutropenic fever 

with sepsis, likely will not be able to tolerate 2nd cycle of chemo   





(2) Neutropenic fever


Code(s): D70.9 - NEUTROPENIA, UNSPECIFIED; R50.81 - FEVER PRESENTING WITH 

CONDITIONS CLASSIFIED ELSEWHERE   Status: Acute   Comment: continue supportive 

therapy and neutropenic protocol   





(3) Sepsis due to coagulase-negative staphylococcal infection


Code(s): A41.1 - SEPSIS DUE TO OTHER SPECIFIED STAPHYLOCOCCUS   Status: Acute   

Comment: 2/2 blood C&S positive, continue Vancomycin after reviewing current 

Vanc trough, MRSE noted on 2/2 blood cx   





(4) Status post chemotherapy, time since less than 4 weeks


Code(s): Z92.21 - PERSONAL HISTORY OF ANTINEOPLASTIC CHEMOTHERAPY   Status: 

Acute   





(5) Atrial fibrillation with rapid ventricular response


Code(s): I48.91 - UNSPECIFIED ATRIAL FIBRILLATION   Status: Chronic   Comment: 

Rate variable, continue Coreg 6.25mg BID   





(6) COPD (chronic obstructive pulmonary disease)


Status: Chronic   


Qualifiers: 


   COPD type: unspecified COPD   Qualified Code(s): J44.9 - Chronic obstructive 

pulmonary disease, unspecified   


Comment: Continue Duonebs, Pulmicort, O2 support   





(7) DM type 2 (diabetes mellitus, type 2)


Status: Chronic   


Qualifiers: 


   Diabetes mellitus complication status: without complication 





- Plan


continue antibiotics, PT/OT, , speech therapy, respiratory 

therapy, DVT proph w/SCDs


Continue Vancomycin after confirming current Vanc level


-: Continue Levaquin and Cefepime another 24h then de-escalate


-: Start NS @ 75ml/h


-: Palliative care for dispo planning


-: Consider hospice evaluation given co-morbid status





* AM lab: BMP, CBC

## 2018-02-03 LAB
ANION GAP SERPL CALC-SCNC: 10 MMOL/L (ref 10–20)
BUN SERPL-MCNC: 54 MG/DL (ref 8.4–25.7)
CALCIUM SERPL-MCNC: 8.4 MG/DL (ref 7.8–10.44)
CHLORIDE SERPL-SCNC: 114 MMOL/L (ref 98–107)
CO2 SERPL-SCNC: 22 MMOL/L (ref 23–31)
CREAT CL PREDICTED SERPL C-G-VRATE: 75 ML/MIN (ref 70–130)
DOHLE BOD BLD QL SMEAR: SLIGHT
GLUCOSE SERPL-MCNC: 226 MG/DL (ref 83–110)
HGB BLD-MCNC: 8.8 G/DL (ref 14–18)
MCH RBC QN AUTO: 34.7 PG (ref 27–31)
MCV RBC AUTO: 102 FL (ref 80–94)
MDIFF COMPLETE?: YES
PLATELET # BLD AUTO: 33 THOU/UL (ref 130–400)
PLATELET BLD QL SMEAR: (no result)
POTASSIUM SERPL-SCNC: 3.4 MMOL/L (ref 3.5–5.1)
RBC # BLD AUTO: 2.53 MILL/UL (ref 4.7–6.1)
SODIUM SERPL-SCNC: 143 MMOL/L (ref 136–145)
VANCOMYCIN SERPL-MCNC: 22.7 UG/ML
WBC # BLD AUTO: 16.3 THOU/UL (ref 4.8–10.8)

## 2018-02-03 RX ADMIN — NYSTATIN SCH: 100000 SUSPENSION ORAL at 18:11

## 2018-02-03 RX ADMIN — Medication SCH ML: at 21:14

## 2018-02-03 RX ADMIN — NYSTATIN SCH: 100000 SUSPENSION ORAL at 11:38

## 2018-02-03 RX ADMIN — NYSTATIN SCH: 100000 SUSPENSION ORAL at 16:10

## 2018-02-03 RX ADMIN — CEFEPIME HYDROCHLORIDE SCH MLS: 2 INJECTION, POWDER, FOR SOLUTION INTRAVENOUS at 20:54

## 2018-02-03 RX ADMIN — INSULIN LISPRO PRN UNIT: 100 INJECTION, SOLUTION INTRAVENOUS; SUBCUTANEOUS at 16:14

## 2018-02-03 RX ADMIN — CEFEPIME HYDROCHLORIDE SCH MLS: 2 INJECTION, POWDER, FOR SOLUTION INTRAVENOUS at 10:17

## 2018-02-03 RX ADMIN — Medication SCH: at 11:38

## 2018-02-03 RX ADMIN — NYSTATIN SCH: 100000 SUSPENSION ORAL at 20:17

## 2018-02-03 RX ADMIN — MULTIPLE VITAMINS W/ MINERALS TAB SCH: TAB at 11:38

## 2018-02-03 RX ADMIN — CYANOCOBALAMIN TAB 1000 MCG SCH: 1000 TAB at 11:37

## 2018-02-03 RX ADMIN — DAPTOMYCIN SCH MLS: 500 INJECTION, POWDER, LYOPHILIZED, FOR SOLUTION INTRAVENOUS at 16:15

## 2018-02-03 NOTE — CT
BRAIN CT WITHOUT IV CONTRAST:

2/3/18

 

HISTORY: 

77-year-old male with lethargy and incoherence. History of lung cancer. 

 

COMPARISON:  

1/29/18. 

 

FINDINGS:  

No mass or midline shift. There is atrophy and chronic white matter ischemic change. No acute hemorrh
age. Mild sinus mucosal disease. The mastoids appear clear. 

 

IMPRESSION:  

Stable atrophy and chronic  white matter ischemic change. No mass or bleed or other acute process. St
able from prior study. 

 

POS: [facility]

## 2018-02-03 NOTE — PDOC.PN
- Subjective


Encounter Start Date: 02/03/18


Encounter Start Time: 07:00


-: non-verbal





pt is confused, not following command, he is afebrile





- Objective


Resuscitation Status: 


 











Resuscitation Status           DNR:Do Not Resuscitate














MAR Reviewed: Yes


Vital Signs & Weight: 


 Vital Signs (12 hours)











  Temp Pulse Resp BP BP Pulse Ox


 


 02/03/18 11:54  97.9 F  118 H  24 H   143/87 H  96


 


 02/03/18 11:38     139/97 H  


 


 02/03/18 11:37     139/97 H  


 


 02/03/18 08:00  98.0 F  102 H  24 H    98


 


 02/03/18 07:26       90 L


 


 02/03/18 07:10  98.0 F  102 H  24 H   137/99 H  98


 


 02/03/18 07:07       98


 


 02/03/18 07:06   80  20   


 


 02/03/18 04:00  98.1 F  112 H  20   134/64  95








 Weight











Admit Weight                   256 lb 9.6 oz


 


Weight                         256 lb 9.6 oz














I&O: 


 











 02/02/18 02/03/18 02/04/18





 06:59 06:59 06:59


 


Intake Total 1100 1739 


 


Output Total 1  


 


Balance 1099 1739 











Result Diagrams: 


 02/03/18 05:45





 02/03/18 05:45


Additional Labs: 


 Accuchecks











  02/03/18 02/03/18 02/02/18





  11:26 05:29 20:34


 


POC Glucose  241 H  233 H  179 H














  02/02/18





  16:18


 


POC Glucose  235 H














Phys Exam





- Physical Examination


Constitutional: NAD


HEENT: PERRLA, sclera anicteric


Neck: no JVD, supple


Respiratory: no wheezing, no rales, no rhonchi


Cardiovascular: irregular


SM+


Gastrointestinal: soft, non-tender, no distention, positive bowel sounds


Musculoskeletal: no edema, pulses present


unable to assess


Lymphatic: no nodes


Deviation from normal: unable to assess


Skin: no rash, normal turgor





Dx/Plan


(1) Acute kidney injury


Code(s): N17.9 - ACUTE KIDNEY FAILURE, UNSPECIFIED   Status: Acute   





(2) Hypokalemia


Code(s): E87.6 - HYPOKALEMIA   Status: Acute   





(3) Lung cancer


Code(s): C34.90 - MALIGNANT NEOPLASM OF UNSP PART OF UNSP BRONCHUS OR LUNG   

Status: Acute   


Qualifiers: 


   Laterality: unspecified laterality 


Comment: s/p 1 cycle of chemotherapy with pancytopenia and neutropenic fever 

with sepsis, likely will not be able to tolerate 2nd cycle of chemo   





(4) Neutropenic fever


Code(s): D70.9 - NEUTROPENIA, UNSPECIFIED; R50.81 - FEVER PRESENTING WITH 

CONDITIONS CLASSIFIED ELSEWHERE   Status: Acute   Comment: continue supportive 

therapy and neutropenic protocol   





(5) Pancytopenia due to chemotherapy


Code(s): D61.810 - ANTINEOPLASTIC CHEMOTHERAPY INDUCED PANCYTOPENIA   Status: 

Acute   





(6) Sepsis due to coagulase-negative staphylococcal infection


Code(s): A41.1 - SEPSIS DUE TO OTHER SPECIFIED STAPHYLOCOCCUS   Status: Acute   

Comment: 2/2 blood C&S positive, continue Vancomycin after reviewing current 

Vanc trough, MRSE noted on 2/2 blood cx   





(7) Status post chemotherapy, time since less than 4 weeks


Code(s): Z92.21 - PERSONAL HISTORY OF ANTINEOPLASTIC CHEMOTHERAPY   Status: 

Acute   





(8) Atrial fibrillation


Code(s): I48.91 - UNSPECIFIED ATRIAL FIBRILLATION   Status: Chronic   


Qualifiers: 


   Atrial fibrillation type: chronic   Qualified Code(s): I48.2 - Chronic 

atrial fibrillation   





(9) COPD (chronic obstructive pulmonary disease)


Status: Chronic   


Qualifiers: 


   COPD type: unspecified COPD   Qualified Code(s): J44.9 - Chronic obstructive 

pulmonary disease, unspecified   


Comment: Continue Duonebs, Pulmicort, O2 support   





(10) DM type 2 (diabetes mellitus, type 2)


Status: Chronic   


Qualifiers: 


   Diabetes mellitus complication status: without complication 





- Plan


cont current plan of care, continue antibiotics





* will repeat blood culture


* palliative care to see


* he may need hospice if family agrees


* will continue current treatment plan


* medication reviewed as below


* symptomatic treatment.


* prognosis is very poor








Review of Systems





- Review of Systems


Other: 





unable to review due to his current cognitive status





- Medications/Allergies


Allergies/Adverse Reactions: 


 Allergies











Allergy/AdvReac Type Severity Reaction Status Date / Time


 


Sulfa (Sulfonamide Allergy   Verified 01/29/18 03:25





Antibiotics)     











Medications: 


 Current Medications





Acetaminophen (Tylenol)  650 mg PO Q4H PRN


   PRN Reason: Headache/Fever or Pain


Hydrocodone Bitart/Acetaminophen (Norco 5/325)  1 tab PO Q4H PRN


   PRN Reason: Moderate Pain (4-6)


   Last Admin: 02/01/18 21:04 Dose:  1 tab


Al Hydroxide/Mg Hydroxide (Maalox)  30 ml PO Q6H PRN


   PRN Reason: Heartburn  or Indigestion


Albuterol/Ipratropium (Duoneb)  3 ml NEB [id number] [facility]


   Last Admin: 02/03/18 07:06 Dose:  3 ml


Alprazolam (Xanax)  1 mg PO BID [facility]


   Last Admin: 02/03/18 11:37 Dose:  Not Given


Artificial Tears (Tears Naturale)  0 drop EA EYE PRN PRN


   PRN Reason: Dry Eyes


Atorvastatin Calcium (Lipitor)  10 mg PO HS [facility]


   Last Admin: 02/02/18 22:03 Dose:  Not Given


Budesonide (Pulmicort Neb Solution)  0.5 mg INH BID-RT [facility]


   Last Admin: 02/03/18 07:07 Dose:  0.5 mg


Carvedilol (Coreg)  6.25 mg PO BID [facility]


   Last Admin: 02/03/18 11:37 Dose:  Not Given


Cyanocobalamin (Vitamin B-12)  1,000 mcg PO DAILY [facility]


   Last Admin: 02/03/18 11:37 Dose:  Not Given


Dextrose/Water (Dextrose 50%)  25 gm SLOW IVP PRN PRN


   PRN Reason: Hypoglycemia


Duloxetine HCl (Cymbalta)  30 mg PO DAILY [facility]


   Last Admin: 02/03/18 11:37 Dose:  Not Given


Famotidine (Pepcid)  20 mg PO BID [facility]


   Last Admin: 02/03/18 11:38 Dose:  Not Given


Folic Acid (Folvite)  1 mg PO DAILY [facility]


   Last Admin: 02/03/18 11:38 Dose:  Not Given


Glucagon (Glucagon)  1 mg IM PRN PRN


   PRN Reason: Hypoglycemia


Guaifenesin (Robitussin Sf)  200 mg PO Q4H PRN


   PRN Reason: Cough


Hydralazine HCl (Apresoline)  10 mg SLOW IVP Q4H PRN


   PRN Reason: Systolic BP > 180


Cefepime HCl 2 gm/ Syringe 2.5 (ml/ Sterile Water)  12.5 mls @ 150 mls/hr SLOW 

IVP Q12HR [facility]


   Last Admin: 02/03/18 10:17 Dose:  12.5 mls


Dextrose/Water (D5w)  1,000 mls @ 0 mls/hr IV .Q0M PRN; As Directed


   PRN Reason: Hypoglycemia


Daptomycin 700 mg/Miscellaneous Medication 1 each/ Sodium Chloride  100 mls @ 

200 mls/hr IVPB Q24HR [facility]


   Last Admin: 02/02/18 15:56 Dose:  100 mls


Sodium Chloride (Normal Saline 0.9%)  1,000 mls @ 50 mls/hr IV .Q20H [facility]


   Stop: 02/04/18 08:14


Insulin Human Lispro (Humalog)  0 units SC .MODERATE SLIDING SC PRN


   PRN Reason: Moderate Correctional Scale


   Last Admin: 02/02/18 17:18 Dose:  4 unit


Insulin Human Lispro (Humalog)  0 units SC .BEDTIME SLIDING SC PRN


   PRN Reason: Bedtime Correctional Scale


   Last Admin: 01/31/18 04:53 Dose:  3 unit


Iron/Minerals/Multivitamins (Theragran M)  1 tab PO DAILY [facility]


   Last Admin: 02/03/18 11:38 Dose:  Not Given


Lisinopril (Zestril)  20 mg PO DAILY [facility]


   Last Admin: 02/03/18 11:38 Dose:  Not Given


Loperamide HCl (Imodium)  2 mg PO PRN PRN


   PRN Reason: Diarrhea/Loose Stools


Loratadine (Claritin)  10 mg PO DAILYPRN PRN


   PRN Reason: Sinus Symptoms


Magnesium Hydroxide (Milk Of Magnesium)  30 ml PO DAILYPRN PRN


   PRN Reason: Constipation


Mineral Oil/White Petrolatum (Eucerin Cream)  0 gm TOP BIDPRN PRN


   PRN Reason: Dry Skin


Nicotine (Nicoderm Patch)  14 mg TOP Q24HR [facility]


   Last Admin: 02/02/18 17:13 Dose:  14 mg


Nitroglycerin (Nitrostat)  0.4 mg SL Q5MIN PRN


   PRN Reason: Chest Pain


Nystatin (Mycostatin)  500,000 units SSW QID [facility]


   Last Admin: 02/03/18 11:38 Dose:  Not Given


Ondansetron HCl (Zofran Odt)  4 mg PO Q6H PRN


   PRN Reason: Nausea/Vomiting


Ondansetron HCl (Zofran)  4 mg IVP Q6H PRN


   PRN Reason: Nausea/Vomiting


Phenol (Chloraseptic Spray 180 Ml Bot)  0 ml PO PRN PRN


   PRN Reason: Sore Throat


Quetiapine Fumarate (Seroquel)  50 mg PO DAILY [facility]


   Last Admin: 02/03/18 11:38 Dose:  Not Given


Quetiapine Fumarate (Seroquel)  100 mg PO HS [facility]


   Last Admin: 02/02/18 21:57 Dose:  100 mg


Senna (Senokot)  2 tab PO HSPRN PRN


   PRN Reason: Constipation


Sodium Chloride (Ocean Nasal Spray 0.65%)  0 ml EA NARE QIDPRN PRN


   PRN Reason: Nasal Congestion


Sodium Chloride (Flush - Normal Saline)  10 ml IVF Q12HR [facility]


   Last Admin: 02/03/18 11:38 Dose:  Not Given


Sodium Chloride (Flush - Normal Saline)  10 ml IVF PRN PRN


   PRN Reason: Saline Flush


   Last Admin: 01/30/18 06:48 Dose:  10 ml

## 2018-02-03 NOTE — PRG
DATE OF SERVICE:  02/03/2018

 

SUBJECTIVE:  The patient is arousable, but still has a hard time with speech.  He did establish eye c
ontact briefly, but for the most part, keeps his eyes closed and did not follow commands.

 

OBJECTIVE:

VITAL SIGNS:  Showed a T-max of 98.1, currently 97.9.  Blood pressure 140/80, pulse 118, respiratory 
rate 24 and O2 sat 96% on nasal cannula chronically ill.

HEENT:  A little bit of gurgling in the upper airways.  Dry oral cavity.

NECK:  Supple.

LUNGS:  With a few inspiratory crackles in scattered areas.

HEART:  S1 and S2 regular rate.

ABDOMEN:  Moderately distended, but not tender.  No rebound tenderness.  No suprapubic does no suprap
ubic distention.

EXTREMITIES:  Move extremities.  

 

LABORATORY DATA:  The white cell count is up to 16.3, hemoglobin 8.8, platelets 33,000 with 61% neutr
ophils and 11% bands.  Chemistry:  The creatinine 1.36, which is a little bit down from last visit an
d potassium 3.4.  Microbiology with Staph epidermidis 2/2 sets.

 

ASSESSMENT AND DISCUSSION:  Small cell lung cancer, diagnosed elsewhere.  Currently, having received 
1 course of chemotherapy with now neutropenic fever as well as delirium and Staph epidermides bactere
bakari, which could represent contamination of the sample or true bacteremia in a setting of immunosuppr
ession.  White cell count has improved and recovered.  It seems likely he has over shot probably from
 the effects of growth factor administration.  It is not clear that he did receive that filgrastim.  
Continue broad-spectrum coverage with cefepime and daptomycin and supportive measures.

## 2018-02-03 NOTE — CT
SOFT TISSUE NECK CT SCAN WITH IV CONTRAST:

2/3/18

 

HISTORY: 

77-year-old male with history of throat pain and history of lung cancer. 

 

There is some moderate motion artifact. There bilateral pleural effusions, at least moderate in  size
, greater on the right side. There is a 2.6 x 3.0 cm diameter poorly circumscribed left upper chest l
ateral pleural based mass. This mass is not significantly changed from a chest film of 1/11/18. 

 

There are some left supraclavicular lymph nodes measuring up to 1 cm in short axis. There is evidence
 for a fracture which is displaced through the distal clavicle. There appears to be an associated 4 c
m diameter, somewhat lower attenuation soft tissue component with some minimal calcification within t
he margins of this mass. This is very concerning for a metastasis with some prominent soft tissue com
ponent and probably resulting in a pathologic fracture of the distal clavicle with displacement. Ther
e is also an area of soft tissue fullness measuring approximately 2.5 cm in diameter near the coracoc
lavicular ligaments on the left side, possibly a soft tissue mass as well. There is no evidence for c
ervical adenopathy. The parotid glands and submaxillary glands are unremarkable. 

 

IMPRESSION:  

Bilateral pleural effusions, at least moderate in size, larger on the right side. Poorly circumscribe
d pleural based mass in the upper right chest without associated bony destruction. Multiple left supr
aclavicular lymph nodes up to approximately 1 cm in diameter. Probable pathologic fracture of the dis
tere left clavicle with an associated 4 cm diameter soft tissue component. Additionally, there is an a
pproximately 2.5 cm diameter soft tissue component at the left coracoclavicular ligament region, poss
ibly a soft tissue mass as well. No evidence for other significant adenopathy in the neck. No mass or
 abscess or abnormal fluid collection in the soft tissue neck. 

 

POS: MAKENNA

## 2018-02-03 NOTE — EKG
Test Reason : 

Blood Pressure : ***/*** mmHG

Vent. Rate : 123 BPM     Atrial Rate : 144 BPM

   P-R Int : 000 ms          QRS Dur : 156 ms

    QT Int : 320 ms       P-R-T Axes : 000 -70 117 degrees

   QTc Int : 458 ms

 

Atrial fibrillation with rapid ventricular response

Left axis deviation

Right bundle branch block

Septal infarct , age undetermined

T wave abnormality, consider lateral ischemia or digitalis effect

Abnormal ECG

 

Confirmed by SAIDA ORTIZ ([id number]),  ANNETTE WHEAT ([id number]) on 2/3/2018 1:46:48 PM

 

Referred By:             Confirmed By:SAIDA ORTIZ

## 2018-02-04 LAB
ANION GAP SERPL CALC-SCNC: 14 MMOL/L (ref 10–20)
BUN SERPL-MCNC: 52 MG/DL (ref 8.4–25.7)
CALCIUM SERPL-MCNC: 8.8 MG/DL (ref 7.8–10.44)
CHLORIDE SERPL-SCNC: 117 MMOL/L (ref 98–107)
CO2 SERPL-SCNC: 20 MMOL/L (ref 23–31)
CREAT CL PREDICTED SERPL C-G-VRATE: 72 ML/MIN (ref 70–130)
GLUCOSE SERPL-MCNC: 254 MG/DL (ref 83–110)
HGB BLD-MCNC: 9 G/DL (ref 14–18)
MCH RBC QN AUTO: 34.2 PG (ref 27–31)
MCV RBC AUTO: 102 FL (ref 80–94)
MDIFF COMPLETE?: YES
PLATELET # BLD AUTO: 55 THOU/UL (ref 130–400)
PLATELET BLD QL SMEAR: (no result)
POTASSIUM SERPL-SCNC: 3.7 MMOL/L (ref 3.5–5.1)
RBC # BLD AUTO: 2.61 MILL/UL (ref 4.7–6.1)
SODIUM SERPL-SCNC: 147 MMOL/L (ref 136–145)
WBC # BLD AUTO: 20.9 THOU/UL (ref 4.8–10.8)

## 2018-02-04 RX ADMIN — NYSTATIN SCH: 100000 SUSPENSION ORAL at 20:36

## 2018-02-04 RX ADMIN — CEFEPIME HYDROCHLORIDE SCH MLS: 2 INJECTION, POWDER, FOR SOLUTION INTRAVENOUS at 21:28

## 2018-02-04 RX ADMIN — NYSTATIN SCH: 100000 SUSPENSION ORAL at 16:06

## 2018-02-04 RX ADMIN — INSULIN LISPRO PRN UNIT: 100 INJECTION, SOLUTION INTRAVENOUS; SUBCUTANEOUS at 11:36

## 2018-02-04 RX ADMIN — Medication SCH ML: at 08:34

## 2018-02-04 RX ADMIN — CYANOCOBALAMIN TAB 1000 MCG SCH: 1000 TAB at 08:36

## 2018-02-04 RX ADMIN — CEFEPIME HYDROCHLORIDE SCH MLS: 2 INJECTION, POWDER, FOR SOLUTION INTRAVENOUS at 08:34

## 2018-02-04 RX ADMIN — INSULIN LISPRO PRN UNIT: 100 INJECTION, SOLUTION INTRAVENOUS; SUBCUTANEOUS at 06:00

## 2018-02-04 RX ADMIN — Medication PRN ML: at 14:11

## 2018-02-04 RX ADMIN — INSULIN LISPRO PRN UNIT: 100 INJECTION, SOLUTION INTRAVENOUS; SUBCUTANEOUS at 16:07

## 2018-02-04 RX ADMIN — MULTIPLE VITAMINS W/ MINERALS TAB SCH: TAB at 08:37

## 2018-02-04 RX ADMIN — Medication SCH ML: at 21:54

## 2018-02-04 RX ADMIN — DAPTOMYCIN SCH MLS: 500 INJECTION, POWDER, LYOPHILIZED, FOR SOLUTION INTRAVENOUS at 14:10

## 2018-02-04 RX ADMIN — NYSTATIN SCH: 100000 SUSPENSION ORAL at 08:38

## 2018-02-04 RX ADMIN — NYSTATIN SCH: 100000 SUSPENSION ORAL at 12:08

## 2018-02-04 NOTE — PDOC.PN
- Subjective


Encounter Start Date: 02/04/18


Encounter Start Time: 07:00





pt is confused, drawsy, no fever, wife decided about hospice





- Objective


Resuscitation Status: 


 











Resuscitation Status           DNR:Do Not Resuscitate














MAR Reviewed: Yes


Vital Signs & Weight: 


 Vital Signs (12 hours)











  Temp Pulse Resp BP BP Pulse Ox


 


 02/04/18 08:37     112/62  


 


 02/04/18 08:35     112/62  


 


 02/04/18 07:45  97.5 F L  101 H  24 H   147/88 H  95


 


 02/04/18 06:57       95


 


 02/04/18 04:00  97.5 F L  100  20   130/82  92 L


 


 02/03/18 23:07  99.2 F  140 H  24 H   138/87  95








 Weight











Admit Weight                   256 lb 9.6 oz


 


Weight                         256 lb 9.6 oz














I&O: 


 











 02/03/18 02/04/18 02/05/18





 06:59 06:59 06:59


 


Intake Total 1739 950 


 


Balance 1739 950 











Result Diagrams: 


 02/04/18 06:00





 02/04/18 06:00


Additional Labs: 


 Accuchecks











  02/04/18 02/03/18 02/03/18





  05:35 20:38 16:06


 


POC Glucose  257 H  192 H  257 H














  02/03/18





  11:26


 


POC Glucose  241 H














Phys Exam





- Physical Examination


Constitutional: NAD


HEENT: PERRLA, sclera anicteric


Neck: no JVD, supple


Respiratory: no wheezing, no rales, no rhonchi


Cardiovascular: no significant murmur, no rub, irregular


Gastrointestinal: soft, non-tender, no distention, positive bowel sounds


Musculoskeletal: no edema, pulses present


Neurological: moves all 4 limbs


Lymphatic: no nodes


Deviation from normal: disoriented, drawsy


Skin: no rash, normal turgor





Dx/Plan


(1) Acute kidney injury


Code(s): N17.9 - ACUTE KIDNEY FAILURE, UNSPECIFIED   Status: Acute   





(2) Hypokalemia


Code(s): E87.6 - HYPOKALEMIA   Status: Acute   





(3) Lung cancer


Code(s): C34.90 - MALIGNANT NEOPLASM OF UNSP PART OF UNSP BRONCHUS OR LUNG   

Status: Acute   


Qualifiers: 


   Laterality: unspecified laterality 


Comment: s/p 1 cycle of chemotherapy with pancytopenia and neutropenic fever 

with sepsis, likely will not be able to tolerate 2nd cycle of chemo   





(4) Neutropenic fever


Code(s): D70.9 - NEUTROPENIA, UNSPECIFIED; R50.81 - FEVER PRESENTING WITH 

CONDITIONS CLASSIFIED ELSEWHERE   Status: Acute   Comment: continue supportive 

therapy and neutropenic protocol   





(5) Pancytopenia due to chemotherapy


Code(s): D61.810 - ANTINEOPLASTIC CHEMOTHERAPY INDUCED PANCYTOPENIA   Status: 

Acute   





(6) Sepsis due to coagulase-negative staphylococcal infection


Code(s): A41.1 - SEPSIS DUE TO OTHER SPECIFIED STAPHYLOCOCCUS   Status: Acute   

Comment: 2/2 blood C&S positive, continue Vancomycin after reviewing current 

Vanc trough, MRSE noted on 2/2 blood cx   





(7) Status post chemotherapy, time since less than 4 weeks


Code(s): Z92.21 - PERSONAL HISTORY OF ANTINEOPLASTIC CHEMOTHERAPY   Status: 

Acute   





(8) Atrial fibrillation


Code(s): I48.91 - UNSPECIFIED ATRIAL FIBRILLATION   Status: Chronic   


Qualifiers: 


   Atrial fibrillation type: chronic   Qualified Code(s): I48.2 - Chronic 

atrial fibrillation   





(9) COPD (chronic obstructive pulmonary disease)


Status: Chronic   


Qualifiers: 


   COPD type: unspecified COPD   Qualified Code(s): J44.9 - Chronic obstructive 

pulmonary disease, unspecified   


Comment: Continue Duonebs, Pulmicort, O2 support   





(10) DM type 2 (diabetes mellitus, type 2)


Status: Chronic   


Qualifiers: 


   Diabetes mellitus complication status: without complication 





- Plan


cont current plan of care, 





* as oncology expressed hope of cure with chemotherapy, will wait until 

oncology OK with hospice


* meanwhile will continue current medical treatment with cefepime and daptomycin


* will repeat labs tomorrow


* medication reviewed as below


* symptomatic treatment.


* currently because of his cognitive status, he is not able to take oral meds


* prognosis is guarded








Review of Systems





- Review of Systems


Other: 





unable to review as pt is disoriented and drawsy





- Medications/Allergies


Allergies/Adverse Reactions: 


 Allergies











Allergy/AdvReac Type Severity Reaction Status Date / Time


 


Sulfa (Sulfonamide Allergy   Verified 01/29/18 03:25





Antibiotics)     











Medications: 


 Current Medications





Acetaminophen (Tylenol)  650 mg PO Q4H PRN


   PRN Reason: Headache/Fever or Pain


Hydrocodone Bitart/Acetaminophen (Norco 5/325)  1 tab PO Q4H PRN


   PRN Reason: Moderate Pain (4-6)


   Last Admin: 02/01/18 21:04 Dose:  1 tab


Al Hydroxide/Mg Hydroxide (Maalox)  30 ml PO Q6H PRN


   PRN Reason: Heartburn  or Indigestion


Albuterol/Ipratropium (Duoneb)  3 ml NEB [id number] [facility]


   Last Admin: 02/04/18 06:57 Dose:  Not Given


Alprazolam (Xanax)  1 mg PO BID [facility]


   Last Admin: 02/04/18 08:35 Dose:  Not Given


Artificial Tears (Tears Naturale)  0 drop EA EYE PRN PRN


   PRN Reason: Dry Eyes


Atorvastatin Calcium (Lipitor)  10 mg PO HS [facility]


   Last Admin: 02/03/18 20:16 Dose:  Not Given


Budesonide (Pulmicort Neb Solution)  0.5 mg INH BID-RT [facility]


   Last Admin: 02/04/18 06:56 Dose:  Not Given


Carvedilol (Coreg)  6.25 mg PO BID [facility]


   Last Admin: 02/04/18 08:35 Dose:  Not Given


Cyanocobalamin (Vitamin B-12)  1,000 mcg PO DAILY [facility]


   Last Admin: 02/04/18 08:36 Dose:  Not Given


Dextrose/Water (Dextrose 50%)  25 gm SLOW IVP PRN PRN


   PRN Reason: Hypoglycemia


Duloxetine HCl (Cymbalta)  30 mg PO DAILY [facility]


   Last Admin: 02/04/18 08:36 Dose:  Not Given


Famotidine (Pepcid)  20 mg PO BID [facility]


   Last Admin: 02/04/18 08:37 Dose:  Not Given


Folic Acid (Folvite)  1 mg PO DAILY [facility]


   Last Admin: 02/04/18 08:37 Dose:  Not Given


Glucagon (Glucagon)  1 mg IM PRN PRN


   PRN Reason: Hypoglycemia


Guaifenesin (Robitussin Sf)  200 mg PO Q4H PRN


   PRN Reason: Cough


Hydralazine HCl (Apresoline)  10 mg SLOW IVP Q4H PRN


   PRN Reason: Systolic BP > 180


Cefepime HCl 2 gm/ Syringe 2.5 (ml/ Sterile Water)  12.5 mls @ 150 mls/hr SLOW 

IVP Q12HR [facility]


   Last Admin: 02/04/18 08:34 Dose:  12.5 mls


Dextrose/Water (D5w)  1,000 mls @ 0 mls/hr IV .Q0M PRN; As Directed


   PRN Reason: Hypoglycemia


Daptomycin 700 mg/Miscellaneous Medication 1 each/ Sodium Chloride  100 mls @ 

200 mls/hr IVPB Q24HR [facility]


   Last Admin: 02/03/18 16:15 Dose:  100 mls


Insulin Human Lispro (Humalog)  0 units SC .MODERATE SLIDING SC PRN


   PRN Reason: Moderate Correctional Scale


   Last Admin: 02/04/18 06:00 Dose:  4 unit


Insulin Human Lispro (Humalog)  0 units SC .BEDTIME SLIDING SC PRN


   PRN Reason: Bedtime Correctional Scale


   Last Admin: 01/31/18 04:53 Dose:  3 unit


Iron/Minerals/Multivitamins (Theragran M)  1 tab PO DAILY [facility]


   Last Admin: 02/04/18 08:37 Dose:  Not Given


Lisinopril (Zestril)  20 mg PO DAILY [facility]


   Last Admin: 02/04/18 08:37 Dose:  Not Given


Loperamide HCl (Imodium)  2 mg PO PRN PRN


   PRN Reason: Diarrhea/Loose Stools


Loratadine (Claritin)  10 mg PO DAILYPRN PRN


   PRN Reason: Sinus Symptoms


Magnesium Hydroxide (Milk Of Magnesium)  30 ml PO DAILYPRN PRN


   PRN Reason: Constipation


Mineral Oil/White Petrolatum (Eucerin Cream)  0 gm TOP BIDPRN PRN


   PRN Reason: Dry Skin


Nicotine (Nicoderm Patch)  14 mg TOP Q24HR [facility]


   Last Admin: 02/03/18 18:11 Dose:  14 mg


Nitroglycerin (Nitrostat)  0.4 mg SL Q5MIN PRN


   PRN Reason: Chest Pain


Nystatin (Mycostatin)  500,000 units SSW QID [facility]


   Last Admin: 02/04/18 08:38 Dose:  Not Given


Ondansetron HCl (Zofran Odt)  4 mg PO Q6H PRN


   PRN Reason: Nausea/Vomiting


Ondansetron HCl (Zofran)  4 mg IVP Q6H PRN


   PRN Reason: Nausea/Vomiting


Phenol (Chloraseptic Spray 180 Ml Bot)  0 ml PO PRN PRN


   PRN Reason: Sore Throat


Quetiapine Fumarate (Seroquel)  50 mg PO DAILY [facility]


   Last Admin: 02/04/18 08:38 Dose:  Not Given


Quetiapine Fumarate (Seroquel)  100 mg PO HS [facility]


   Last Admin: 02/03/18 20:17 Dose:  Not Given


Senna (Senokot)  2 tab PO HSPRN PRN


   PRN Reason: Constipation


Sodium Chloride (Ocean Nasal Spray 0.65%)  0 ml EA NARE QIDPRN PRN


   PRN Reason: Nasal Congestion


Sodium Chloride (Flush - Normal Saline)  10 ml IVF Q12HR [facility]


   Last Admin: 02/04/18 08:34 Dose:  10 ml


Sodium Chloride (Flush - Normal Saline)  10 ml IVF PRN PRN


   PRN Reason: Saline Flush


   Last Admin: 01/30/18 06:48 Dose:  10 ml

## 2018-02-05 ENCOUNTER — HOSPITAL ENCOUNTER (INPATIENT)
Dept: HOSPITAL 92 - ONC | Age: 78
LOS: 1 days | Discharge: HOSPICE | DRG: 951 | End: 2018-02-06
Attending: INTERNAL MEDICINE | Admitting: INTERNAL MEDICINE
Payer: COMMERCIAL

## 2018-02-05 VITALS — TEMPERATURE: 98.4 F | DIASTOLIC BLOOD PRESSURE: 81 MMHG | SYSTOLIC BLOOD PRESSURE: 134 MMHG

## 2018-02-05 VITALS — BODY MASS INDEX: 30.3 KG/M2

## 2018-02-05 DIAGNOSIS — D69.6: ICD-10-CM

## 2018-02-05 DIAGNOSIS — E78.5: ICD-10-CM

## 2018-02-05 DIAGNOSIS — Z66: ICD-10-CM

## 2018-02-05 DIAGNOSIS — Z91.81: ICD-10-CM

## 2018-02-05 DIAGNOSIS — E87.6: ICD-10-CM

## 2018-02-05 DIAGNOSIS — N17.9: ICD-10-CM

## 2018-02-05 DIAGNOSIS — I48.2: ICD-10-CM

## 2018-02-05 DIAGNOSIS — C34.90: ICD-10-CM

## 2018-02-05 DIAGNOSIS — A41.1: ICD-10-CM

## 2018-02-05 DIAGNOSIS — D70.9: ICD-10-CM

## 2018-02-05 DIAGNOSIS — Z79.01: ICD-10-CM

## 2018-02-05 DIAGNOSIS — I11.0: ICD-10-CM

## 2018-02-05 DIAGNOSIS — I50.33: ICD-10-CM

## 2018-02-05 DIAGNOSIS — J44.9: ICD-10-CM

## 2018-02-05 DIAGNOSIS — T45.1X5A: ICD-10-CM

## 2018-02-05 DIAGNOSIS — Z51.5: Primary | ICD-10-CM

## 2018-02-05 DIAGNOSIS — R50.81: ICD-10-CM

## 2018-02-05 DIAGNOSIS — E11.9: ICD-10-CM

## 2018-02-05 DIAGNOSIS — Z87.891: ICD-10-CM

## 2018-02-05 DIAGNOSIS — F32.9: ICD-10-CM

## 2018-02-05 DIAGNOSIS — D61.810: ICD-10-CM

## 2018-02-05 DIAGNOSIS — F41.9: ICD-10-CM

## 2018-02-05 RX ADMIN — NYSTATIN SCH: 100000 SUSPENSION ORAL at 15:53

## 2018-02-05 RX ADMIN — MULTIPLE VITAMINS W/ MINERALS TAB SCH: TAB at 09:56

## 2018-02-05 RX ADMIN — CYANOCOBALAMIN TAB 1000 MCG SCH: 1000 TAB at 09:57

## 2018-02-05 RX ADMIN — NYSTATIN SCH: 100000 SUSPENSION ORAL at 09:56

## 2018-02-05 RX ADMIN — DAPTOMYCIN SCH MLS: 500 INJECTION, POWDER, LYOPHILIZED, FOR SOLUTION INTRAVENOUS at 15:08

## 2018-02-05 RX ADMIN — CEFEPIME HYDROCHLORIDE SCH MLS: 2 INJECTION, POWDER, FOR SOLUTION INTRAVENOUS at 09:52

## 2018-02-05 RX ADMIN — INSULIN LISPRO PRN UNIT: 100 INJECTION, SOLUTION INTRAVENOUS; SUBCUTANEOUS at 12:22

## 2018-02-05 RX ADMIN — Medication SCH ML: at 09:56

## 2018-02-05 NOTE — DIS
PRIMARY CARE PHYSICIAN:   _____ 

 

DATE OF ADMISSION:  01/29/2018

 

DATE OF DISCHARGE:  02/05/2018

 

DISCHARGE DISPOSITION:  Inpatient hospice facility for comfort care.

 

PRIMARY DISCHARGE DIAGNOSES:  Neutropenic fever, severe thrombocytopenia, neutropenia, macrocytic ane
bakari, coagulopathy, demand ischemia of myocardium, acute on chronic diastolic congestive heart failure
, atrial fibrillation with rapid ventricular response, chronic obstructive pulmonary disease exacerba
tion, sepsis with acute organ dysfunction, and Staphylococcus epidermidis bacteremia.

 

SECONDARY DISCHARGE DIAGNOSES:  Diabetes type 2, frequent fall, physical deconditioning, small cell l
king cancer, chronic obstructive pulmonary disease, chronic atrial fibrillation, and chronic diastolic
 heart failure.

 

PRIMARY PROCEDURE/OPERATION:  None.

 

RADIOLOGICAL INVESTIGATION:  Chest x-ray, brain CT, CT cervical spine, echocardiography, modified bar
ium swallow, CT brain, CT soft tissue neck.

 

SIGNIFICANT LABORATORY DATA:  By the time of discharge, WBC 20.9, hemoglobin 9.0, platelets 55.  INR 
2.2, creatinine 1.42.  Blood culture was initially positive for Staphylococcus epidermidis and then i
t was negative.

 

DISCHARGE MEDICATIONS:  The patient will be on hospice order while in hospital for comfort care.  We 
will discontinue all previous medication.

 

CONTRAINDICATIONS:  None.

 

CODE STATUS:  DNR.

 

INPATIENT CONSULTANTS:  Oncology was following while in hospital.  Dr. Sherman was consulted while in h
ospital.

 

TEST RESULTS PENDING ON DISCHARGE:  None.

 

ALLERGIES:  SULFA DRUGS.

 

DISCHARGE PLAN:  Post hospital, the patient is going to be discharged to inpatient hospice facility i
n our [facility].

 

HOSPITAL COURSE:  A 77-year-old male, who has underlying history of small cell lung cancer and he rec
eived one chemotherapy; after that, he became downhill.  He developed severe thrombocytopenia, severe
 neutropenia, and neutropenic fever.  He was having sepsis with acute organ dysfunction.  He was DNR.
  His source of infection was unclear, but his blood culture was positive for Staph epidermidis.  Rep
eat blood culture was negative.  Dr. Sherman saw this patient, he had elevated BNP, which was related w
ith acute on chronic diastolic heart failure.  He had elevated troponin, which was related sepsis fro
m demand ischemia.  He was also having coagulopathy due to sepsis.  He was also treated for COPD flar
e-up while in hospital.  His atrial fibrillation was controlled with medication.

 

The patient was not making any progress and wife wanted to do only comfort care.  At that point, we c
onsulted hospice.  Oncology was also okay with hospice, at this time, point, patient's prognosis is v
breezy poor.  The patient is not going to tolerate any more chemotherapy.  The patient's wife does not w
ant him to suffer and that is why the patient's wife wanted to send him to inpatient hospice facility
 for comfort care.

 

Out of hospital DNR paperwork done.

 

Total time spent on discharge day 31 minutes.

## 2018-02-05 NOTE — PDOC.PN
- Subjective


Encounter Start Date: 02/05/18


Encounter Start Time: 07:00





Patient seen and examined.   No overnight events


pt is confused





- Objective


Resuscitation Status: 


 











Resuscitation Status           DNR:Do Not Resuscitate














MAR Reviewed: Yes


Vital Signs & Weight: 


 Vital Signs (12 hours)











  Temp Pulse Resp BP Pulse Ox


 


 02/05/18 08:30  98.4 F  93  24 H   96


 


 02/05/18 07:35  98.4 F  93  24 H  134/81  96


 


 02/05/18 06:37   84  20   95


 


 02/05/18 03:54  98.2 F  96  20  108/63  96


 


 02/04/18 23:38  97.6 F  106 H  24 H  129/91 H  96








 Weight











Admit Weight                   256 lb 9.6 oz


 


Weight                         256 lb 9.6 oz














I&O: 


 











 02/04/18 02/05/18 02/06/18





 06:59 06:59 06:59


 


Intake Total 950 100 


 


Balance 950 100 











Result Diagrams: 


 02/04/18 06:00





 02/04/18 06:00


Additional Labs: 


 Accuchecks











  02/05/18 02/04/18 02/04/18





  05:44 20:45 16:03


 


POC Glucose  218 H  157 H  201 H














  02/04/18





  11:27


 


POC Glucose  230 H














Phys Exam





- Physical Examination


Constitutional: NAD


HEENT: PERRLA, moist MMs, sclera anicteric


Neck: no JVD, supple


Respiratory: no wheezing, no rales, no rhonchi


Cardiovascular: no significant murmur, no rub, irregular


Gastrointestinal: soft, non-tender, no distention, positive bowel sounds


Musculoskeletal: no edema, pulses present


Neurological: moves all 4 limbs


Skin: normal turgor





Dx/Plan


(1) Acute kidney injury


Code(s): N17.9 - ACUTE KIDNEY FAILURE, UNSPECIFIED   Status: Acute   





(2) Hypokalemia


Code(s): E87.6 - HYPOKALEMIA   Status: Acute   





(3) Lung cancer


Code(s): C34.90 - MALIGNANT NEOPLASM OF UNSP PART OF UNSP BRONCHUS OR LUNG   

Status: Acute   


Qualifiers: 


   Laterality: unspecified laterality 


Comment: s/p 1 cycle of chemotherapy with pancytopenia and neutropenic fever 

with sepsis, likely will not be able to tolerate 2nd cycle of chemo   





(4) Neutropenic fever


Code(s): D70.9 - NEUTROPENIA, UNSPECIFIED; R50.81 - FEVER PRESENTING WITH 

CONDITIONS CLASSIFIED ELSEWHERE   Status: Acute   Comment: continue supportive 

therapy and neutropenic protocol   





(5) Pancytopenia due to chemotherapy


Code(s): D61.810 - ANTINEOPLASTIC CHEMOTHERAPY INDUCED PANCYTOPENIA   Status: 

Acute   





(6) Sepsis due to coagulase-negative staphylococcal infection


Code(s): A41.1 - SEPSIS DUE TO OTHER SPECIFIED STAPHYLOCOCCUS   Status: Acute   

Comment: 2/2 blood C&S positive, continue Vancomycin after reviewing current 

Vanc trough, MRSE noted on 2/2 blood cx   





(7) Status post chemotherapy, time since less than 4 weeks


Code(s): Z92.21 - PERSONAL HISTORY OF ANTINEOPLASTIC CHEMOTHERAPY   Status: 

Acute   





(8) Atrial fibrillation


Code(s): I48.91 - UNSPECIFIED ATRIAL FIBRILLATION   Status: Chronic   


Qualifiers: 


   Atrial fibrillation type: chronic   Qualified Code(s): I48.2 - Chronic 

atrial fibrillation   





(9) COPD (chronic obstructive pulmonary disease)


Status: Chronic   


Qualifiers: 


   COPD type: unspecified COPD   Qualified Code(s): J44.9 - Chronic obstructive 

pulmonary disease, unspecified   


Comment: Continue Duonebs, Pulmicort, O2 support   





(10) DM type 2 (diabetes mellitus, type 2)


Status: Chronic   


Qualifiers: 


   Diabetes mellitus complication status: without complication 





- Plan


cont current plan of care, 





* today hospice will see pt and if approved for inpt hospice, will consider 

discharge


* wife and oncology ok with that.








Review of Systems





- Review of Systems


Other: 





unable to review due to encephalopathy





- Medications/Allergies


Allergies/Adverse Reactions: 


 Allergies











Allergy/AdvReac Type Severity Reaction Status Date / Time


 


Sulfa (Sulfonamide Allergy   Verified 01/29/18 03:25





Antibiotics)     











Medications: 


 Current Medications





Acetaminophen (Tylenol)  650 mg PO Q4H PRN


   PRN Reason: Headache/Fever or Pain


Hydrocodone Bitart/Acetaminophen (Norco 5/325)  1 tab PO Q4H PRN


   PRN Reason: Moderate Pain (4-6)


   Last Admin: 02/01/18 21:04 Dose:  1 tab


Al Hydroxide/Mg Hydroxide (Maalox)  30 ml PO Q6H PRN


   PRN Reason: Heartburn  or Indigestion


Albuterol/Ipratropium (Duoneb)  3 ml NEB [id number] ELOY


   Last Admin: 02/05/18 06:37 Dose:  3 ml


Alprazolam (Xanax)  1 mg PO BID [facility]


   Last Admin: 02/05/18 09:57 Dose:  Not Given


Artificial Tears (Tears Naturale)  0 drop EA EYE PRN PRN


   PRN Reason: Dry Eyes


Atorvastatin Calcium (Lipitor)  10 mg PO HS [facility]


   Last Admin: 02/04/18 20:23 Dose:  Not Given


Budesonide (Pulmicort Neb Solution)  0.5 mg INH BID-RT [facility]


   Last Admin: 02/05/18 06:37 Dose:  0.5 mg


Carvedilol (Coreg)  6.25 mg PO BID [facility]


   Last Admin: 02/05/18 09:57 Dose:  Not Given


Cyanocobalamin (Vitamin B-12)  1,000 mcg PO DAILY [facility]


   Last Admin: 02/05/18 09:57 Dose:  Not Given


Dextrose/Water (Dextrose 50%)  25 gm SLOW IVP PRN PRN


   PRN Reason: Hypoglycemia


Duloxetine HCl (Cymbalta)  30 mg PO DAILY [facility]


   Last Admin: 02/05/18 09:56 Dose:  Not Given


Famotidine (Pepcid)  20 mg PO BID [facility]


   Last Admin: 02/05/18 09:56 Dose:  Not Given


Folic Acid (Folvite)  1 mg PO DAILY [facility]


   Last Admin: 02/05/18 09:56 Dose:  Not Given


Glucagon (Glucagon)  1 mg IM PRN PRN


   PRN Reason: Hypoglycemia


Guaifenesin (Robitussin Sf)  200 mg PO Q4H PRN


   PRN Reason: Cough


Hydralazine HCl (Apresoline)  10 mg SLOW IVP Q4H PRN


   PRN Reason: Systolic BP > 180


Cefepime HCl 2 gm/ Syringe 2.5 (ml/ Sterile Water)  12.5 mls @ 150 mls/hr SLOW 

IVP Q12HR [facility]


   Last Admin: 02/05/18 09:52 Dose:  12.5 mls


Dextrose/Water (D5w)  1,000 mls @ 0 mls/hr IV .Q0M PRN; As Directed


   PRN Reason: Hypoglycemia


Daptomycin 700 mg/Miscellaneous Medication 1 each/ Sodium Chloride  100 mls @ 

200 mls/hr IVPB Q24HR [facility]


   Last Admin: 02/04/18 14:10 Dose:  100 mls


Insulin Human Lispro (Humalog)  0 units SC .MODERATE SLIDING SC PRN


   PRN Reason: Moderate Correctional Scale


   Last Admin: 02/04/18 16:07 Dose:  4 unit


Insulin Human Lispro (Humalog)  0 units SC .BEDTIME SLIDING SC PRN


   PRN Reason: Bedtime Correctional Scale


   Last Admin: 01/31/18 04:53 Dose:  3 unit


Iron/Minerals/Multivitamins (Theragran M)  1 tab PO DAILY [facility]


   Last Admin: 02/05/18 09:56 Dose:  Not Given


Labetalol HCl (Normodyne)  10 mg SLOW IVP Q3H PRN


   PRN Reason: SBP>180 OR HR >120


Lisinopril (Zestril)  20 mg PO DAILY [facility]


   Last Admin: 02/05/18 09:56 Dose:  Not Given


Loperamide HCl (Imodium)  2 mg PO PRN PRN


   PRN Reason: Diarrhea/Loose Stools


Loratadine (Claritin)  10 mg PO DAILYPRN PRN


   PRN Reason: Sinus Symptoms


Magnesium Hydroxide (Milk Of Magnesium)  30 ml PO DAILYPRN PRN


   PRN Reason: Constipation


Mineral Oil/White Petrolatum (Eucerin Cream)  0 gm TOP BIDPRN PRN


   PRN Reason: Dry Skin


Morphine Sulfate (Morphine)  2 mg SLOW IVP Q3H PRN


   PRN Reason: Pain


   Last Admin: 02/04/18 21:50 Dose:  2 mg


Nicotine (Nicoderm Patch)  14 mg TOP Q24HR [facility]


   Last Admin: 02/04/18 17:10 Dose:  14 mg


Nitroglycerin (Nitrostat)  0.4 mg SL Q5MIN PRN


   PRN Reason: Chest Pain


Nystatin (Mycostatin)  500,000 units SSW QID [facility]


   Last Admin: 02/05/18 09:56 Dose:  Not Given


Ondansetron HCl (Zofran Odt)  4 mg PO Q6H PRN


   PRN Reason: Nausea/Vomiting


Ondansetron HCl (Zofran)  4 mg IVP Q6H PRN


   PRN Reason: Nausea/Vomiting


Phenol (Chloraseptic Spray 180 Ml Bot)  0 ml PO PRN PRN


   PRN Reason: Sore Throat


Quetiapine Fumarate (Seroquel)  50 mg PO DAILY [facility]


   Last Admin: 02/05/18 09:56 Dose:  Not Given


Quetiapine Fumarate (Seroquel)  100 mg PO HS [facility]


   Last Admin: 02/04/18 20:36 Dose:  Not Given


Senna (Senokot)  2 tab PO HSPRN PRN


   PRN Reason: Constipation


Sodium Chloride (Ocean Nasal Spray 0.65%)  0 ml EA NARE QIDPRN PRN


   PRN Reason: Nasal Congestion


Sodium Chloride (Flush - Normal Saline)  10 ml IVF Q12HR ELOY


   Last Admin: 02/05/18 09:56 Dose:  10 ml


Sodium Chloride (Flush - Normal Saline)  10 ml IVF PRN PRN


   PRN Reason: Saline Flush


   Last Admin: 02/04/18 14:11 Dose:  10 ml

## 2018-02-05 NOTE — HP
PRIMARY CARE PHYSICIAN:  _____

 

DATE OF ADMISSION:  02/05/2018

 

REASON FOR ADMISSION:  Hospice care in the hospital.

 

HISTORY OF PRESENT ILLNESS:  A 77-year-old male who has a recently diagnosed with small cell lung can
cer and he had first cycle of chemotherapy.  After that, the patient developed significant downhill. 
 He developed severe neutropenia and thrombocytopenia.  He was admitted to the hospital on 01/29/2018
 with sepsis with acute organ dysfunction and neutropenic fever.  He was having AFib with RVR.  He wa
s having acute on chronic COPD exacerbation as well as diastolic heart failure exacerbation.  The pat
chucho was admitted to telemetry floor.  The patient was treated with antibiotic therapy.  His blood cu
lture was positive for Staph epidermides.  Subsequently, as his condition was not improving, and that
 is why he was moved to Oncology floor.  The patient's wife decided to make him comfortable and [facility] accepted for hospice care.

 

The patient is supposed to go to [facility] facility, but as they do not have bed availabl
e that is why this patient is staying in hospital.

 

REVIEW OF SYSTEMS:  All review of systems tried to review with the patient, but unable to review heber
use of encephalopathy.

 

ALLERGIES:  SULFA DRUGS.

 

PAST MEDICAL HISTORY:  Metastatic small cell lung cancer on chemotherapy, atrial fibrillation, chroni
c hypertension, COPD, dyslipidemia, and diabetes.

 

PAST SURGICAL HISTORY:  Thoracentesis, lung biopsy, and cholecystectomy.

 

PAST PSYCHIATRIC HISTORY:  Anxiety, depression.

 

SOCIAL HISTORY:  The patient is .  He has history of smoking.  He was a heavy smoker.  He che
es any alcohol abuse.  He denies any other illicit drug abuse.

 

FAMILY HISTORY:  No strong family history of premature coronary artery disease, stroke or cancer.

 

CURRENT HOME MEDICATIONS:  Xanax 1 mg q.8 hourly p.r.n., aspirin 81 mg p.o. daily, Coreg 6.25 mg p.o.
 b.i.d., Cymbalta 60 mg p.o. daily, Eliquis 5 mg p.o. daily, Lasix 20 mg p.o. daily, lovastatin 40 mg
 p.o. at bedtime, Seroquel 50 mg p.o. in the morning and 100 mg at bedtime, Aldactone 25 mg p.o. vivek
y, DuoNeb twice daily.

 

PHYSICAL EXAMINATION:

VITAL SIGNS:  Currently, blood pressure 86/55, pulse 121 and irregular, temperature 99.6, saturation 
94% on 2 liter oxygen.

GENERAL:  The patient is completely encephalopathic, does not follow any commands, confused, disorien
herman.

HEENT:  Head:  Normocephalic, atraumatic.  Eyes:  Pupils round, reactive to light.  Extraocular muscl
e intact.  ENT:  Dry mucous membranes, no oral lesions.  No pharyngeal erythema.

NECK:  Supple, no JVD, no thyromegaly.

LUNGS:  Bibasilar coarse breath sounds.

CARDIAC:  S1, S2, irregularly irregular.

ABDOMEN:  Soft and benign without any tenderness.

EXTREMITIES:  No edema.

NEUROLOGIC:  The patient is moving all four limbs, but detailed neurological examination not possible
.

PSYCHIATRIC:  Unable to assess.

SKIN:  The patient does have multiple bruises from thrombocytopenia all over his body.

 

SIGNIFICANT LABORATORY DATA:  Laboratory investigation reviewed from previous hospitalization.

 

IMPRESSION:  History of neutropenic fever and severe neutropenia, resolved; severe thrombocytopenia; 
macrocytic anemia; sepsis with acute organ dysfunction; MRSE bacteremia; atrial fibrillation with rap
id ventricular response; chronic obstructive pulmonary disease exacerbation; acute on chronic diastol
ic heart failure; frequent falls and physical deconditioning; acute encephalopathy; and history of sm
all cell lung cancer with chemotherapy.

 

PLAN:  The patient is admitted for hospice care, comfort care only.  The patient is DNR and confirmed
 with the patient's wife, hospice and comfort care orders were written.  This patient will go to [facility] facility when they have bed opening.

## 2018-02-06 VITALS — SYSTOLIC BLOOD PRESSURE: 122 MMHG | TEMPERATURE: 99.2 F | DIASTOLIC BLOOD PRESSURE: 65 MMHG

## 2018-02-07 NOTE — DIS
PRIMARY CARE PHYSICIAN:  Dr. Ky Morales 

 

DATE OF ADMISSION:  02/05/2018

 

DATE OF DISCHARGE:  02/06/2018

 

DISCHARGE DISPOSITION:  [facility] facility.

 

PRIMARY DISCHARGE DIAGNOSES:

1.  Metastatic lung cancer.  

2.  Neutropenic fever.

3.  Pancytopenia due to chemotherapy.

4.  Sepsis due to coagulase negative Staphylococcus aureus.

 

SECONDARY DISCHARGE DIAGNOSES:  Diabetes type 2, chronic obstructive pulmonary disease, atrial fibril
lation.

 

SHORT HOSPITAL SUMMARY:  This patient was admitted by us in hospital for hospice care.  There was no 
bed available at [facility] and that is why we kept him in hospital one day.  Yes
terday bed opened at hospice facility and the patient was discharged there.  Further care up until pa
sharonnt's terminal outcome will be given by [facility] team.

 

The patient was seen and examined on the day of discharge.  Please see my progress note from that day
.

## 2025-06-17 NOTE — PDOC.PN
- Subjective


Encounter Start Date: 02/06/18


Encounter Start Time: 09:45





Patient seen and examined. lethargic,. No overnight events





- Objective


Resuscitation Status: 


 











Resuscitation Status           DNR:Do Not Resuscitate














MAR Reviewed: Yes


Vital Signs & Weight: 


 Vital Signs (12 hours)











  Temp Pulse Resp Pulse Ox


 


 02/06/18 08:00  97.4 F L  133 H  24 H  94 L


 


 02/06/18 07:00  97.4 F L  133 H  24 H  94 L








 Weight











Weight                         256 lb














I&O: 


 











 02/05/18 02/06/18 02/07/18





 06:59 06:59 06:59


 


Intake Total  0 


 


Balance  0 














Phys Exam





- Physical Examination


Constitutional: NAD


HEENT: PERRLA, moist MMs, sclera anicteric


Neck: no JVD, supple


Respiratory: no wheezing, no rales, no rhonchi


Cardiovascular: no significant murmur, irregular


Gastrointestinal: soft, non-tender, no distention, positive bowel sounds


Musculoskeletal: no edema, pulses present


Lymphatic: no nodes


Psychiatric: normal affect


Skin: no rash, normal turgor





Dx/Plan


(1) Admission for hospice care


Code(s): Z51.5 - ENCOUNTER FOR PALLIATIVE CARE   Status: Acute   





(2) Acute kidney injury


Code(s): N17.9 - ACUTE KIDNEY FAILURE, UNSPECIFIED   Status: Acute   





(3) Hypokalemia


Code(s): E87.6 - HYPOKALEMIA   Status: Acute   





(4) Lung cancer


Code(s): C34.90 - MALIGNANT NEOPLASM OF UNSP PART OF UNSP BRONCHUS OR LUNG   

Status: Acute   Comment:     





(5) Neutropenic fever


Code(s): D70.9 - NEUTROPENIA, UNSPECIFIED; R50.81 - FEVER PRESENTING WITH 

CONDITIONS CLASSIFIED ELSEWHERE   Status: Acute   Comment:     





(6) Pancytopenia due to chemotherapy


Code(s): D61.810 - ANTINEOPLASTIC CHEMOTHERAPY INDUCED PANCYTOPENIA   Status: 

Acute   





(7) Sepsis due to coagulase-negative staphylococcal infection


Code(s): A41.1 - SEPSIS DUE TO OTHER SPECIFIED STAPHYLOCOCCUS   Status: Acute   

Comment:     





(8) Status post chemotherapy, time since less than 4 weeks


Code(s): Z92.21 - PERSONAL HISTORY OF ANTINEOPLASTIC CHEMOTHERAPY   Status: 

Acute   





(9) Atrial fibrillation


Code(s): I48.91 - UNSPECIFIED ATRIAL FIBRILLATION   Status: Chronic   


Qualifiers: 


 





(10) COPD (chronic obstructive pulmonary disease)


Status: Chronic   


Qualifiers: 


 


Comment:     





(11) DM type 2 (diabetes mellitus, type 2)


Status: Chronic   





- Plan


cont current plan of care, 





* continue current comfort care meds


* pt is currently comfortable


* if bed available at [facility] [place], then will transfer there


* medication reviewed as below


* symptomatic treatment.








Review of Systems





- Review of Systems


Other: 





unable to review due to encephaloapthy





- Medications/Allergies


Allergies/Adverse Reactions: 


 Allergies











Allergy/AdvReac Type Severity Reaction Status Date / Time


 


Sulfa (Sulfonamide Allergy   Verified 02/06/18 03:39





Antibiotics)     











Medications: 


 Current Medications





Acetaminophen (Tylenol)  650 mg PO Q4H PRN


   PRN Reason: OR MILD PAIN (1-3); T>101 F


Acetaminophen (Tylenol)  650 mg MT Q4H PRN


   PRN Reason: FOR MILD PAIN (0-3); T> 101 F


Diphenhydramine HCl (Benadryl)  25 mg IVP Q6H PRN


   PRN Reason: Itching or Copious Secretions


Haloperidol Lactate (Haldol)  5 mg SLOW IVP Q6H PRN


   PRN Reason: Agitation or Restlessness


Hyoscyamine Sulfate (Levsin Sl)  0.125 mg SL QIDPRN PRN


   PRN Reason: GI Cramps/Copious secretions


Lorazepam (Ativan)  1 mg SLOW IVP Q4H PRN


   PRN Reason: FOR AGIATATION OR RESTLESSNESS


   Last Admin: 02/06/18 09:05 Dose:  1 mg


Morphine Sulfate (Roxanol Solution)  20 mg SL Q2H PRN


   PRN Reason: SEVER PAIN (7-10)


   Last Admin: 02/06/18 10:11 Dose:  20 mg


Ondansetron HCl (Zofran)  4 mg IVP Q6H PRN


   PRN Reason: Nausea/Vomiting


Scopolamine (Transderm Scop)  1.5 mg TOP Q3D PRN


   PRN Reason: SECRETIONS


Sodium Chloride (Flush - Normal Saline)  10 ml IVF PRN PRN


   PRN Reason: Saline Flush ----- Message from Marce Ha DO sent at 6/13/2025 10:00 AM CDT -----  Blood cultures are negative, no signs of infection in the blood.